# Patient Record
Sex: FEMALE | Race: WHITE | NOT HISPANIC OR LATINO | Employment: FULL TIME | URBAN - METROPOLITAN AREA
[De-identification: names, ages, dates, MRNs, and addresses within clinical notes are randomized per-mention and may not be internally consistent; named-entity substitution may affect disease eponyms.]

---

## 2017-01-13 ENCOUNTER — OFFICE VISIT - HEALTHEAST (OUTPATIENT)
Dept: MIDWIFE SERVICES | Facility: CLINIC | Age: 27
End: 2017-01-13

## 2017-01-13 DIAGNOSIS — Z30.432 ENCOUNTER FOR IUD REMOVAL: ICD-10-CM

## 2017-01-13 ASSESSMENT — MIFFLIN-ST. JEOR: SCORE: 1228.91

## 2017-03-29 ENCOUNTER — OFFICE VISIT - HEALTHEAST (OUTPATIENT)
Dept: MIDWIFE SERVICES | Facility: CLINIC | Age: 27
End: 2017-03-29

## 2017-03-29 DIAGNOSIS — Z00.00 HEALTH CARE MAINTENANCE: ICD-10-CM

## 2017-03-29 DIAGNOSIS — Z30.9 CONTRACEPTIVE MANAGEMENT: ICD-10-CM

## 2017-03-29 ASSESSMENT — MIFFLIN-ST. JEOR: SCORE: 1213.94

## 2017-09-17 ENCOUNTER — VIRTUAL VISIT (OUTPATIENT)
Dept: FAMILY MEDICINE | Facility: OTHER | Age: 27
End: 2017-09-17

## 2017-09-17 NOTE — PROGRESS NOTES
"Date:   Clinician: Christian Cristobal  Clinician NPI: [id number]  Patient: Tatum Cooper  Patient : 1990  Patient Address: [address] [place] [address]  Patient Phone: [phone]  Visit Protocol: GERD  Patient Summary:  Tatum is a 27 year old ( : 1990 ) female who initiated a Visit for evaluation of heartburn or reflux (GERD).  When asked the question \"Please sign me up to receive news, health information and promotions. \", Tatum responded \"No\".    Her symptoms have persisted for less than one month.  The patient currently feels her symptoms are a new sensation given the lack of previous GERD symptoms. And last less than one hour in duration.   Over the past 7 days, the patient had experienced:      Heartburn: never     Regurgitation: once     Nausea: for 4-7 days     Pain the center of the upper stomach: for 4-7 days     Difficulty getting a good night's sleep because of heartburn and/or regurgitation: once      Tatum has not taken medications to treat heartburn and/or regurgitation other than what was previously prescribed over the past 7 days.    Tatum claims:     Lying down does not aggravate symptoms     Bending over does not aggravate symptoms.      The patient denies:     Heartburn with unintentional weight loss.    Difficulty swallowing solid foods    Vomiting blood    Persistent vomiting.    Chest Pain    Wheezing associated with heartburn    Crohn's disease    Ulcerative colitis    Tumors in colon     A modified diet has NOT been effective.   The patient weighs approximately: 109 pounds and is 5 feet 4 inches tall.   PAST TREATMENTS:  None     She denies pregnancy and denies breastfeeding. She has menstruated in the past month.   Tatum does not smoke or use smokeless tobacco.   Additional information as reported by the patient (free text): I have been experiencing significant upper abdominal/stomach pain 30 minutes after eating for the past week. It " is somewhat alleviated by burping, but does not seem to matter what I eat.   MEDICATIONS:  None  , ALLERGIES:  NKDA    Clinician Response:  Dear Tatum,   I am sorry you are not feeling well. Your health is our priority. Based on the information provided, an in-person evaluation is required. Please be seen in a clinic or urgent care to receive the additional care you need.  You will not be charged for this Visit. Thank you for trusting us with your care.   Diagnosis: Refer for additional evaluation  Diagnosis ICD: R69  Diagnosis ICD: 462.0

## 2017-09-19 ENCOUNTER — OFFICE VISIT - HEALTHEAST (OUTPATIENT)
Dept: INTERNAL MEDICINE | Facility: CLINIC | Age: 27
End: 2017-09-19

## 2017-09-19 DIAGNOSIS — R10.13 EPIGASTRIC ABDOMINAL PAIN: ICD-10-CM

## 2017-09-19 ASSESSMENT — MIFFLIN-ST. JEOR: SCORE: 1207.14

## 2017-10-02 ENCOUNTER — AMBULATORY - HEALTHEAST (OUTPATIENT)
Dept: INTERNAL MEDICINE | Facility: CLINIC | Age: 27
End: 2017-10-02

## 2017-10-02 ENCOUNTER — COMMUNICATION - HEALTHEAST (OUTPATIENT)
Dept: INTERNAL MEDICINE | Facility: CLINIC | Age: 27
End: 2017-10-02

## 2017-10-02 DIAGNOSIS — R10.13 EPIGASTRIC PAIN: ICD-10-CM

## 2017-10-05 ENCOUNTER — COMMUNICATION - HEALTHEAST (OUTPATIENT)
Dept: INTERNAL MEDICINE | Facility: CLINIC | Age: 27
End: 2017-10-05

## 2017-10-05 ENCOUNTER — AMBULATORY - HEALTHEAST (OUTPATIENT)
Dept: LAB | Facility: CLINIC | Age: 27
End: 2017-10-05

## 2017-10-05 DIAGNOSIS — R10.13 EPIGASTRIC ABDOMINAL PAIN: ICD-10-CM

## 2017-10-06 ENCOUNTER — HOSPITAL ENCOUNTER (OUTPATIENT)
Dept: ULTRASOUND IMAGING | Facility: HOSPITAL | Age: 27
Discharge: HOME | End: 2017-10-06

## 2017-10-06 DIAGNOSIS — R10.13 EPIGASTRIC PAIN: ICD-10-CM

## 2017-12-10 ENCOUNTER — COMMUNICATION - HEALTHEAST (OUTPATIENT)
Dept: MIDWIFE SERVICES | Facility: CLINIC | Age: 27
End: 2017-12-10

## 2018-04-27 ENCOUNTER — OFFICE VISIT (OUTPATIENT)
Dept: FAMILY MEDICINE | Facility: CLINIC | Age: 28
End: 2018-04-27
Payer: COMMERCIAL

## 2018-04-27 VITALS
BODY MASS INDEX: 19.33 KG/M2 | HEIGHT: 64 IN | HEART RATE: 74 BPM | SYSTOLIC BLOOD PRESSURE: 107 MMHG | OXYGEN SATURATION: 97 % | TEMPERATURE: 97.3 F | WEIGHT: 113.25 LBS | DIASTOLIC BLOOD PRESSURE: 65 MMHG

## 2018-04-27 DIAGNOSIS — Z00.00 ANNUAL PHYSICAL EXAM: Primary | ICD-10-CM

## 2018-04-27 DIAGNOSIS — Z31.9 DESIRE FOR PREGNANCY: ICD-10-CM

## 2018-04-27 DIAGNOSIS — N92.6 IRREGULAR MENSTRUAL CYCLE: ICD-10-CM

## 2018-04-27 NOTE — MR AVS SNAPSHOT
After Visit Summary   4/27/2018    Tatum Cooper    MRN: [id number]           Patient Information     Date Of Birth          1990        Visit Information        Provider Department      4/27/2018 3:40 PM Jo Calloway MD [facility]        Care Instructions    [facility] [[facility]]: Tamara Malone or Sachinwashington Chowdhury [[place] or [place][]: [phone]    [facility] [[phone]]: [facility] in [place]:  Located in [place].  [facility]: [phone]  [Dolores Lozano B Campbell, MAX Brown]        Dr. Azul or Dr. Willis  At [facility] [phone]    [facility]             Follow-ups after your visit        Who to contact     Please call your clinic at [phone] to:    Ask questions about your health    Make or cancel appointments    Discuss your medicines    Learn about your test results    Speak to your doctor            Additional Information About Your Visit        [employer] Information     [employer] gives you secure access to your electronic health record. If you see a primary care provider, you can also send messages to your care team and make appointments. If you have questions, please call your primary care clinic.  If you do not have a primary care provider, please call [phone] and they will assist you.      [employer] is an electronic gateway that provides easy, online access to your medical records. With [employer], you can request a clinic appointment, read your test results, renew a prescription or communicate with your care team.     To access your existing account, please contact your [facility] or call [phone] for assistance.        Care EveryWhere ID     This is your Care EveryWhere ID. This could be used by other organizations to access your [facility] medical records  [id number]        Your Vitals Were      "Pulse Temperature Height Last Period Pulse Oximetry BMI (Body Mass Index)    74 97.3  F (36.3  C) (Temporal) 5' 4.25\" (163.2 cm) 03/30/2018 (Exact Date) 97% 19.29 kg/m2       Blood Pressure from Last 3 Encounters:   04/27/18 107/65   07/11/16 106/70   06/03/16 126/75    Weight from Last 3 Encounters:   04/27/18 113 lb 4 oz (51.4 kg)   07/11/16 113 lb 1.6 oz (51.3 kg)   06/03/16 114 lb 12.8 oz (52.1 kg)              Today, you had the following     No orders found for display         Today's Medication Changes          These changes are accurate as of 4/27/18  4:06 PM.  If you have any questions, ask your nurse or doctor.               Stop taking these medicines if you haven't already. Please contact your care team if you have questions.     levonorgestrel-ethinyl estradiol 0.1-20 MG-MCG per tablet   Commonly known as:  LUTERA   Stopped by:  Jo Calloway MD                    Primary Care Provider Office Phone # Fax #    Jo Calloway [phone]       [address] [place] [address]        Equal Access to Services     JUAN CARLOS COTTO AH: Pretty horneo Sobill, waaxda luqadaha, qaybta kaalmada adeegyada, shayy hardin. So [facility] [phone].    ATENCIÓN: Si habla español, tiene a forde disposición servicios gratuitos de asistencia lingüística. Llame al [phone].    We comply with applicable federal civil rights laws and [place] laws. We do not discriminate on the basis of race, color, national origin, age, disability, sex, sexual orientation, or gender identity.            Thank you!     Thank you for choosing [facility]  for your care. Our goal is always to provide you with excellent care. Hearing back from our patients is one way we can continue to improve our services. Please take a few minutes to complete the written survey that you may receive in the mail after your visit with us. Thank you!             Your Updated Medication List - " Protect others around you: Learn how to safely use, store and throw away your medicines at www.disposemymeds.org.          This list is accurate as of 4/27/18  4:06 PM.  Always use your most recent med list.                   Brand Name Dispense Instructions for use Diagnosis    OMEGA-3 FISH OIL PO       Travel advice encounter, Preventative health care       paragard intrauterine copper     1 each    Inserted on 6/3/16 Lot #[id number] exp 4/22    Encounter for IUD insertion       PRENATAL VITAMIN PO           PROBIOTIC PO           VITAMIN C PO      Take 500 mg by mouth daily

## 2018-04-27 NOTE — PROGRESS NOTES
"Tatum is a 28 year old female presents who presents to [facility] for annual exam:   HPI:  Healthy young woman who had her  IUD removed and has been attempting pregnancy off and on since August 2017.  Periods are irregular every 20-50 days. LMP March 30, 2018. Doing ovulation tests and +  Test with intercourse X three cycles without pregnancy. She is using an Maria Luisa called \"natural cycles.\"  Wondering about the use of progesterone during the last half of her cycle as her luteal phase is short.    - PAP 3/2017  -  No other concerns is considering another position but remains pleased with her current job.   ROS:  General: none  Head/Eyes: none  Ears/Nose/Throat: none  Cardiovascular: none  Respiratory: none  Gastrointestinal: none  Breast: none  Genitourinary: none  Sexual Function: none  Musculoskeletal: none  Skin: none  Neurological: none  Mental Health: none  Endocrine: none  PAST OB/GYN HISTORY  G0  Menarche:14  Cycles: every 20-50 days   Contraception: none    Immunization: [declined gardisel]   PAST MEDICAL HISTORY:  Past Medical History:   Diagnosis Date     Fracture of finger    Life Style Modifiers:   Tobacco:  reports that she has never smoked. She has never used smokeless tobacco.   Alcohol:  reports that she drinks alcohol.   Drug use:  reports that she does not use illicit drugs.  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   for three years.   MEDICATIONS:  Current Outpatient Prescriptions   Medication Sig Dispense Refill     Ascorbic Acid (VITAMIN C PO) Take 500 mg by mouth daily       Omega-3 Fatty Acids (OMEGA-3 FISH OIL " "PO)        Prenatal Vit-Fe Fumarate-FA (PRENATAL VITAMIN PO)        Probiotic Product (PROBIOTIC PO)        paragard intrauterine copper Inserted on 6/3/16 Lot #[id number] exp 4/22 (Patient not taking: Reported on 4/27/2018) 1 each 0   ALLERGIES:  Peggy-be [norethindrone (contraceptive)]  VITALS:  /65 (BP Location: Right arm, Patient Position: Chair, Cuff Size: Adult Regular)  Pulse 74  Temp 97.3  F (36.3  C) (Temporal)  Ht 5' 4.25\" (163.2 cm)  Wt 113 lb 4 oz (51.4 kg)  LMP 03/30/2018 (Exact Date)  SpO2 97%  BMI 19.29 kg/m2  PHYSICAL EXAM:  Constitutional: Well appearing woman in no acute distress.   Psychological: appropriate mood.  Eyes: anicteric, normal extra-ocular movements  Ears, Nose and Throat: tympanic membranes clear  Neck: No thyroidmegaly.   Cardiovascular: regular rate and rhythm, normal S1 and S2, no murmurs, rubs or gallops, peripheral pulses full and symmetric   Respiratory: clear to auscultation, no wheezes or crackles, normal breath sounds.  Breast: Symmetrical without visible distortion or swelling. No masses noted. No nipple inversion, no breast dimpling or puckering. Axillary area without masses or lympadenapathy.   Gastrointestinal: N/A   Musculoskeletal: full range of motion    Skin: no concerning lesions, no jaundice.  Neurological: normal gait, no tremor.   Diagnoses and associated orders for this visit:  Annual physical exam    - pap up to date and due 3/2019   -  Anticipating pregnancy since August 2017 with three confirmed + ovulations test/intercourse without pregnancy.   -  Irregular cycles of 20-50 days   -  Advised options for intervention: time, acupuncture, option to see GYN for evaluation and/or if no pregnancy in 6 months consider reproductive health with names given.   Other orders  -     Prenatal Vit-Fe Fumarate-FA (PRENATAL VITAMIN PO);     "

## 2018-04-27 NOTE — NURSING NOTE
"28 year old  Chief Complaint   Patient presents with     Physical     Annual Exam       Blood pressure 107/65, pulse 74, temperature 97.3  F (36.3  C), temperature source Temporal, height 5' 4.25\" (163.2 cm), weight 113 lb 4 oz (51.4 kg), last menstrual period 03/30/2018, SpO2 97 %. Body mass index is 19.29 kg/(m^2).  Patient Active Problem List   Diagnosis     Encounter for insertion of ParaGard IUD       Wt Readings from Last 2 Encounters:   04/27/18 113 lb 4 oz (51.4 kg)   07/11/16 113 lb 1.6 oz (51.3 kg)     BP Readings from Last 3 Encounters:   04/27/18 107/65   07/11/16 106/70   06/03/16 126/75         Current Outpatient Prescriptions   Medication     Ascorbic Acid (VITAMIN C PO)     levonorgestrel-ethinyl estradiol (LUTERA) 0.1-20 MG-MCG per tablet     Omega-3 Fatty Acids (OMEGA-3 FISH OIL PO)     paragard intrauterine copper     Prenatal Vit-Fe Fumarate-FA (PRENATAL VITAMIN PO)     Probiotic Product (PROBIOTIC PO)     No current facility-administered medications for this visit.        Social History   Substance Use Topics     Smoking status: Never Smoker     Smokeless tobacco: Never Used     Alcohol use Yes      Comment: Occasionally       Health Maintenance Due   Topic Date Due     HIV SCREEN (SYSTEM ASSIGNED)  03/01/2009     INFLUENZA VACCINE (1) 09/01/2017       Lab Results   Component Value Date    PAP NIL 03/22/2016       Bleinda Camp MA  April 27, 2018 3:35 PM    "

## 2018-04-27 NOTE — PATIENT INSTRUCTIONS
[facility] [[facility]]: Tamara Malone or Sachin Chowdhury [[place] or [place][]: [phone]    [facility] [[phone]]: [facility]ductive [facility] in [place]:  Located in [place].  [facility]: [phone]  [Dolores Lozano B Campbell, P [place]une[place]]        Dr. Azul or Dr. Willis  At [facility] [phone]    [facility]'s [facility]

## 2018-05-09 DIAGNOSIS — N92.6 IRREGULAR MENSES: Primary | ICD-10-CM

## 2018-05-10 DIAGNOSIS — N92.6 IRREGULAR MENSES: ICD-10-CM

## 2018-05-10 DIAGNOSIS — Z13.220 SCREENING FOR CHOLESTEROL LEVEL: ICD-10-CM

## 2018-05-10 DIAGNOSIS — Z13.21 ENCOUNTER FOR VITAMIN DEFICIENCY SCREENING: ICD-10-CM

## 2018-05-10 LAB
CHOLEST SERPL-MCNC: 202 MG/DL
HBA1C MFR BLD: 5 % (ref 0–5.6)
HDLC SERPL-MCNC: 102 MG/DL
LDLC SERPL CALC-MCNC: 91 MG/DL
NONHDLC SERPL-MCNC: 100 MG/DL
PROGEST SERPL-MCNC: 12.3 NG/ML
PROLACTIN SERPL-MCNC: 6 UG/L (ref 3–27)
TRIGL SERPL-MCNC: 46 MG/DL
TSH SERPL DL<=0.005 MIU/L-ACNC: 1.97 MU/L (ref 0.4–4)

## 2018-05-10 PROCEDURE — 80061 LIPID PANEL: CPT | Performed by: OBSTETRICS & GYNECOLOGY

## 2018-05-10 PROCEDURE — 84146 ASSAY OF PROLACTIN: CPT | Performed by: OBSTETRICS & GYNECOLOGY

## 2018-05-10 PROCEDURE — 83036 HEMOGLOBIN GLYCOSYLATED A1C: CPT | Performed by: OBSTETRICS & GYNECOLOGY

## 2018-05-10 PROCEDURE — 36415 COLL VENOUS BLD VENIPUNCTURE: CPT | Performed by: OBSTETRICS & GYNECOLOGY

## 2018-05-10 PROCEDURE — 84403 ASSAY OF TOTAL TESTOSTERONE: CPT | Performed by: OBSTETRICS & GYNECOLOGY

## 2018-05-10 PROCEDURE — 84443 ASSAY THYROID STIM HORMONE: CPT | Performed by: OBSTETRICS & GYNECOLOGY

## 2018-05-10 PROCEDURE — 84144 ASSAY OF PROGESTERONE: CPT | Performed by: OBSTETRICS & GYNECOLOGY

## 2018-05-10 PROCEDURE — 82306 VITAMIN D 25 HYDROXY: CPT | Performed by: OBSTETRICS & GYNECOLOGY

## 2018-05-10 PROCEDURE — 84270 ASSAY OF SEX HORMONE GLOBUL: CPT | Performed by: OBSTETRICS & GYNECOLOGY

## 2018-05-11 LAB — DEPRECATED CALCIDIOL+CALCIFEROL SERPL-MC: 69 UG/L (ref 20–75)

## 2018-05-12 LAB
SHBG SERPL-SCNC: 93 NMOL/L (ref 30–135)
TESTOST FREE SERPL-MCNC: 0.23 NG/DL (ref 0.08–0.74)
TESTOST SERPL-MCNC: 29 NG/DL (ref 8–60)

## 2018-05-31 ENCOUNTER — OFFICE VISIT (OUTPATIENT)
Dept: OBGYN | Facility: CLINIC | Age: 28
End: 2018-05-31
Attending: OBSTETRICS & GYNECOLOGY
Payer: COMMERCIAL

## 2018-05-31 VITALS
DIASTOLIC BLOOD PRESSURE: 70 MMHG | WEIGHT: 110 LBS | HEART RATE: 82 BPM | BODY MASS INDEX: 18.78 KG/M2 | HEIGHT: 64 IN | SYSTOLIC BLOOD PRESSURE: 111 MMHG

## 2018-05-31 DIAGNOSIS — N92.6 IRREGULAR MENSTRUAL CYCLE: Primary | ICD-10-CM

## 2018-05-31 PROCEDURE — 76830 TRANSVAGINAL US NON-OB: CPT | Mod: ZF

## 2018-05-31 PROCEDURE — G0463 HOSPITAL OUTPT CLINIC VISIT: HCPCS

## 2018-05-31 ASSESSMENT — PAIN SCALES - GENERAL: PAINLEVEL: NO PAIN (0)

## 2018-05-31 NOTE — LETTER
2018       RE: Tatum Cooper  [address] [address]     Dear Colleague,    Thank you for referring your patient, Tatum Cooper, to the [facility] at [facility]. Please see a copy of my visit note below.    Progress Note    SUBJECTIVE:  Tatum Cooper is an 28 year old, , here for referral from Dr. Calloway for possible PCOS. She is here to discuss her irregular menstrual cycle and how it may affect fertility. D/c long term use of OCP 2017, unprotected intercourse with her  since that time. Notes that cycle ranges from 22-60 days. Would like to conceive by the end of this summer. Uses a phone virgie and urine LH test to evaluate her cycle. Noticed shortened Luteal phase - + urine LH to time of menstruation 8d, has started getting accupuncture and last luteal phase to 11d.  Does have ovulatory symptoms with change in discharge. No history of painful menstruation or STIs. She is on prenatal vitamin, and takes no other regular medication and denies any known medical diseases.    Menstrual History:   Menstrual History 2018   LAST MENSTRUAL PERIOD 3/30/2018 2018 -   Menarche age - - 15   Period Cycle (Days) - - 52d +/- 23d (shortest 22d)   Period Duration (Days) - - 4   Method of Contraception - - -   Period Pattern - - Irregular   Menstrual Flow - - Moderate   Menstrual Control - - -   Dysmenorrhea - - None   PMS Symptoms - - -   Reviewed Today - - -   Comments - - On OCP from 18yo-23yo, and 25yo-25yo ; had regular withdrawal bleeding during this time     History of abnormal Pap smear:   NO - age 21-29 PAP every 3 years recommended  Last 3 Pap Results:   PAP (no units)   Date Value   2016 NIL       HISTORY:    Immunization History   Administered Date(s) Administered     DTAP (<7y) 1991, 1990, 1991, 10/02/1992, 1996     HEPA 2011, 2012     HepB  "02/24/2000, 04/24/2000, 09/14/2000     Hib (PRP-T) 1991, 1990, 1991, 06/09/1992     MMR 06/09/1992, 02/03/2001     Meningococcal (Menomune ) 04/22/2009     Poliovirus, inactivated (IPV) 1991, 1991, 06/02/1992, 06/27/1996     TD (ADULT, 7+) 08/19/2003     Tdap (Adacel,Boostrix) 04/22/2009     Varicella 11/07/1995       Obstetric History     No data available     Past Medical History:   Diagnosis Date     Fracture of finger      Past Surgical History:   Procedure Laterality Date     CLOSED REDUCTION, PERCUTANEOUS PINNING  HAND, COMBINED  4/25/2014    Procedure: COMBINED CLOSED REDUCTION, PERCUTANEOUS PINNING HAND;  Surgeon: Ayala Rabago MD;  Location:  OR      TOOTH EXTRACTION W/FORCEP       Family History   Problem Relation Age of Onset     Breast Cancer Maternal Grandmother      DIABETES Maternal Grandmother      type II, lifestyle related     Social History     Social History     Marital status: Single     Spouse name: N/A     Number of children: N/A     Years of education: N/A     Social History Main Topics     Smoking status: Never Smoker     Smokeless tobacco: Never Used     Alcohol use Yes      Comment: Occasionally     Drug use: No     Sexual activity: Yes     Partners: Male     Birth control/ protection: Pill     Other Topics Concern     Not on file     Social History Narrative     ROS      Objective:  Blood pressure 111/70, pulse 82, height 1.632 m (5' 4.25\"), weight 49.9 kg (110 lb), last menstrual period 05/14/2018, not currently breastfeeding. Body mass index is 18.73 kg/(m^2).  General - pleasant female in no acute distress.    TVUS 5/31/18: R ovary 1 follicle, L ovary 0 follicles, endometrium 7mm    Labs 5/10/18:  Progesterone 12.3 (post-ovulatory)  Polactin  6  SHBG  93  Testosterone 29  Free Testosterone 0.23      ASSESSMENT/PLAN:  #Irregular cycle with shortened luteal phase - Discussed with pt that the TVUS and post-ovulatory progesterone demonstrates " ovulation- though recent US did not correlate with urine LH testing. As it is under 1 yr, pt agreed to less invasive methods of intervention and evaluation will paln to continue accupuncture at this time.  -Discussed option of progesterone supplementation for luteal phase, although there is no data to support efficacy.   -Discussed OI if cycles remain irregular. Would recommend HSG and semen analysis prior to proceeding.   -advised that small amount of weight gain (~5lbs) at a BMI around 18 may also help with conception.    Additional teaching done at this visit regarding preconception.    Will return if no pregnancy within 1 year or as needed.     I, Ciera Renner, am serving as a scribe; to document services personally performed by  Dr. Willis based on data collection and the provider's statements to me.     Ciera Renner    I, Dr. Willis, personally performed the services described in this documentation, as scribed by Ciera Osborn in my presence, and it is both accurate and complete.   Geri Willis MD

## 2018-05-31 NOTE — NURSING NOTE
Chief Complaint   Patient presents with     RECHECK     Follow-up pelvic ULS   HX PCOS   Carolyne Sandoval LPN

## 2018-05-31 NOTE — PROGRESS NOTES
Progress Note    SUBJECTIVE:  Tatum Cooper is an 28 year old, , here for referral from Dr. Calloway for possible PCOS. She is here to discuss her irregular menstrual cycle and how it may affect fertility. D/c long term use of OCP 2017, unprotected intercourse with her  since that time. Notes that cycle ranges from 22-60 days. Would like to conceive by the end of this summer. Uses a phone virgie and urine LH test to evaluate her cycle. Noticed shortened Luteal phase - + urine LH to time of menstruation 8d, has started getting accupuncture and last luteal phase to 11d.  Does have ovulatory symptoms with change in discharge. No history of painful menstruation or STIs. She is on prenatal vitamin, and takes no other regular medication and denies any known medical diseases.    Menstrual History:   Menstrual History 2018   LAST MENSTRUAL PERIOD 3/30/2018 2018 -   Menarche age - - 15   Period Cycle (Days) - - 52d +/- 23d (shortest 22d)   Period Duration (Days) - - 4   Method of Contraception - - -   Period Pattern - - Irregular   Menstrual Flow - - Moderate   Menstrual Control - - -   Dysmenorrhea - - None   PMS Symptoms - - -   Reviewed Today - - -   Comments - - On OCP from 18yo-23yo, and 25yo-25yo ; had regular withdrawal bleeding during this time     History of abnormal Pap smear:   NO - age 21-29 PAP every 3 years recommended  Last 3 Pap Results:   PAP (no units)   Date Value   2016 NIL       HISTORY:    Immunization History   Administered Date(s) Administered     DTAP (<7y) 1991, 1991, 1991, 10/02/1992, 1996     HEPA 2011, 2012     HepB 2000, 2000, 2000     Hib (PRP-T) 1991, 1991, 1991, 1992     MMR 1992, 2001     Meningococcal (Menomune ) 2009     Poliovirus, inactivated (IPV) 1991, 1991, 1992, 1996     TD (ADULT, 7+) 2003     Tdap  "(Adacel,Boostrix) 04/22/2009     Varicella 11/07/1995       Obstetric History     No data available     Past Medical History:   Diagnosis Date     Fracture of finger      Past Surgical History:   Procedure Laterality Date     CLOSED REDUCTION, PERCUTANEOUS PINNING  HAND, COMBINED  4/25/2014    Procedure: COMBINED CLOSED REDUCTION, PERCUTANEOUS PINNING HAND;  Surgeon: Ayala Rabago MD;  Location: [facility] OR      TOOTH EXTRACTION W/FORCEP       Family History   Problem Relation Age of Onset     Breast Cancer Maternal Grandmother      DIABETES Maternal Grandmother      type II, lifestyle related     Social History     Social History     Marital status: Single     Spouse name: N/A     Number of children: N/A     Years of education: N/A     Social History Main Topics     Smoking status: Never Smoker     Smokeless tobacco: Never Used     Alcohol use Yes      Comment: Occasionally     Drug use: No     Sexual activity: Yes     Partners: Male     Birth control/ protection: Pill     Other Topics Concern     Not on file     Social History Narrative     ROS      Objective:  Blood pressure 111/70, pulse 82, height 1.632 m (5' 4.25\"), weight 49.9 kg (110 lb), last menstrual period 05/14/2018, not currently breastfeeding. Body mass index is 18.73 kg/(m^2).  General - pleasant female in no acute distress.    TVUS 5/31/18: R ovary 1 follicle, L ovary 0 follicles, endometrium 7mm    Labs 5/10/18:  Progesterone 12.3 (post-ovulatory)  Polactin  6  SHBG  93  Testosterone 29  Free Testosterone 0.23      ASSESSMENT/PLAN:  #Irregular cycle with shortened luteal phase - Discussed with pt that the TVUS and post-ovulatory progesterone demonstrates ovulation- though recent US did not correlate with urine LH testing. As it is under 1 yr, pt agreed to less invasive methods of intervention and evaluation will paln to continue accupuncture at this time.  -Discussed option of progesterone supplementation for luteal phase, although there is " no data to support efficacy.   -Discussed OI if cycles remain irregular. Would recommend HSG and semen analysis prior to proceeding.   -advised that small amount of weight gain (~5lbs) at a BMI around 18 may also help with conception.    Additional teaching done at this visit regarding preconception.    Will return if no pregnancy within 1 year or as needed.     I, Ciera Renner, am serving as a scribe; to document services personally performed by  Dr. Willis based on data collection and the provider's statements to me.     Ciera Renner    I, Dr. Willis, personally performed the services described in this documentation, as scribed by Ciera Osborn in my presence, and it is both accurate and complete.   Geri Willis MD

## 2018-05-31 NOTE — MR AVS SNAPSHOT
"              After Visit Summary   5/31/2018    Tatum Cooper    MRN: [id number]           Patient Information     Date Of Birth          1990        Visit Information        Provider Department      5/31/2018 2:45 PM Geri Willis MD [facility]        Today's Diagnoses     Irregular menstrual cycle    -  1       Follow-ups after your visit        Who to contact     Please call your clinic at [phone] to:    Ask questions about your health    Make or cancel appointments    Discuss your medicines    Learn about your test results    Speak to your doctor            Additional Information About Your Visit        MyChart Information     [email or web address] gives you secure access to your electronic health record. If you see a primary care provider, you can also send messages to your care team and make appointments. If you have questions, please call your primary care clinic.  If you do not have a primary care provider, please call [phone] and they will assist you.      [email or web address] is an electronic gateway that provides easy, online access to your medical records. With [email or web address], you can request a clinic appointment, read your test results, renew a prescription or communicate with your care team.     To access your existing account, please contact your [facility] or call [phone] for assistance.        Care EveryWhere ID     This is your Care EveryWhere ID. This could be used by other organizations to access your [place] medical records  [id number]        Your Vitals Were     Pulse Height Last Period Breastfeeding? BMI (Body Mass Index)       82 1.632 m (5' 4.25\") 05/14/2018 No 18.73 kg/m2        Blood Pressure from Last 3 Encounters:   05/31/18 111/70   04/27/18 107/65   07/11/16 106/70    Weight from Last 3 Encounters:   05/31/18 49.9 kg (110 lb)   04/27/18 51.4 kg (113 lb 4 oz)   07/11/16 51.3 kg (113 lb 1.6 oz)              Today, you had the " following     No orders found for display         Today's Medication Changes          These changes are accurate as of 5/31/18 11:59 PM.  If you have any questions, ask your nurse or doctor.               Stop taking these medicines if you haven't already. Please contact your care team if you have questions.     paragard intrauterine copper   Stopped by:  Geri Willis MD                    Primary Care Provider Office Phone # Fax #    Jo Calloway [phone]       [address] [address]        Equal Access to Services     [facility]: Hadii aad ku hadasho Soomaali, waaxda luqadaha, qaybta kaalmada adeegyada, waxyonny romo . So [facility] [phone].    ATENCIÓN: Si habla español, tiene a forde disposición servicios gratuitos de asistencia lingüística. [facility] [phone].    We comply with applicable federal civil rights laws and [place] laws. We do not discriminate on the basis of race, color, national origin, age, disability, sex, sexual orientation, or gender identity.            Thank you!     Thank you for choosing [facility]  for your care. Our goal is always to provide you with excellent care. Hearing back from our patients is one way we can continue to improve our services. Please take a few minutes to complete the written survey that you may receive in the mail after your visit with us. Thank you!             Your Updated Medication List - Protect others around you: Learn how to safely use, store and throw away your medicines at www.disposemymeds.org.          This list is accurate as of 5/31/18 11:59 PM.  Always use your most recent med list.                   Brand Name Dispense Instructions for use Diagnosis    OMEGA-3 FISH OIL PO       Travel advice encounter, Preventative health care       PRENATAL VITAMIN PO           PROBIOTIC PO           VITAMIN C PO      Take 500 mg by mouth daily

## 2018-05-31 NOTE — MR AVS SNAPSHOT
After Visit Summary   5/31/2018    Tatum Cooper    MRN: [id number]           Patient Information     Date Of Birth          1990        Visit Information        Provider Department      5/31/2018 3:00 PM [facility] ULTRASOUND [facility]        Today's Diagnoses     Irregular menstrual cycle    -  1       Follow-ups after your visit        Who to contact     Please call your clinic at [phone] to:    Ask questions about your health    Make or cancel appointments    Discuss your medicines    Learn about your test results    Speak to your doctor            Additional Information About Your Visit        MyChart Information     [employer] gives you secure access to your electronic health record. If you see a primary care provider, you can also send messages to your care team and make appointments. If you have questions, please call your primary care clinic.  If you do not have a primary care provider, please call [phone] and they will assist you.      [employer] is an electronic gateway that provides easy, online access to your medical records. With [employer], you can request a clinic appointment, read your test results, renew a prescription or communicate with your care team.     To access your existing account, please contact your [facility] or call [phone] for assistance.        Care EveryWhere ID     This is your Care EveryWhere ID. This could be used by other organizations to access your [place] medical records  [id number]        Your Vitals Were     Last Period                   05/14/2018            Blood Pressure from Last 3 Encounters:   05/31/18 111/70   04/27/18 107/65   07/11/16 106/70    Weight from Last 3 Encounters:   05/31/18 49.9 kg (110 lb)   04/27/18 51.4 kg (113 lb 4 oz)   07/11/16 51.3 kg (113 lb 1.6 oz)                 Today's Medication Changes          These changes are accurate as of 5/31/18  5:31 PM.  If you have any  questions, ask your nurse or doctor.               Stop taking these medicines if you haven't already. Please contact your care team if you have questions.     paragard intrauterine copper   Stopped by:  Geri Willis MD                    Primary Care Provider Office Phone # Fax #    Jo Calloway [phone]       [address]  [facility] [address]        Equal Access to Services     [facility]: Hadii aad ku hadasho Soomaali, waaxda luqadaha, qaybta kaalmada adeegyada, waxay idiin hayaan adeeg kharash la'aan . So [facility] [phone].    ATENCIÓN: Si habla español, tiene a forde disposición servicios gratuitos de asistencia lingüística. Rosalinaame al [phone].    We comply with applicable federal civil rights laws and [place] laws. We do not discriminate on the basis of race, color, national origin, age, disability, sex, sexual orientation, or gender identity.            Thank you!     Thank you for choosing [facility]  for your care. Our goal is always to provide you with excellent care. Hearing back from our patients is one way we can continue to improve our services. Please take a few minutes to complete the written survey that you may receive in the mail after your visit with us. Thank you!             Your Updated Medication List - Protect others around you: Learn how to safely use, store and throw away your medicines at www.disposemymeds.org.          This list is accurate as of 5/31/18  5:31 PM.  Always use your most recent med list.                   Brand Name Dispense Instructions for use Diagnosis    OMEGA-3 FISH OIL PO       Travel advice encounter, Preventative health care       PRENATAL VITAMIN PO           PROBIOTIC PO           VITAMIN C PO      Take 500 mg by mouth daily

## 2018-07-30 ENCOUNTER — MYC MEDICAL ADVICE (OUTPATIENT)
Dept: OBGYN | Facility: CLINIC | Age: 28
End: 2018-07-30

## 2018-10-11 ENCOUNTER — OFFICE VISIT (OUTPATIENT)
Dept: OBGYN | Facility: CLINIC | Age: 28
End: 2018-10-11
Attending: ADVANCED PRACTICE MIDWIFE
Payer: COMMERCIAL

## 2018-10-11 ENCOUNTER — RADIANT APPOINTMENT (OUTPATIENT)
Dept: ULTRASOUND IMAGING | Facility: CLINIC | Age: 28
End: 2018-10-11
Attending: ADVANCED PRACTICE MIDWIFE
Payer: COMMERCIAL

## 2018-10-11 VITALS
BODY MASS INDEX: 21.25 KG/M2 | SYSTOLIC BLOOD PRESSURE: 106 MMHG | HEIGHT: 64 IN | DIASTOLIC BLOOD PRESSURE: 69 MMHG | HEART RATE: 70 BPM | WEIGHT: 124.5 LBS

## 2018-10-11 DIAGNOSIS — Z34.91 ENCOUNTER FOR SUPERVISION OF NORMAL PREGNANCY IN FIRST TRIMESTER, UNSPECIFIED GRAVIDITY: ICD-10-CM

## 2018-10-11 DIAGNOSIS — Z36.82 ENCOUNTER FOR NUCHAL TRANSLUCENCY TESTING: ICD-10-CM

## 2018-10-11 DIAGNOSIS — Z36.89 ENCOUNTER FOR FETAL ANATOMIC SURVEY: ICD-10-CM

## 2018-10-11 DIAGNOSIS — Z34.01 ENCOUNTER FOR SUPERVISION OF NORMAL FIRST PREGNANCY IN FIRST TRIMESTER: Primary | ICD-10-CM

## 2018-10-11 PROBLEM — D48.5 NEOPLASM OF UNCERTAIN BEHAVIOR OF SKIN: Status: ACTIVE | Noted: 2018-10-11

## 2018-10-11 PROCEDURE — G0463 HOSPITAL OUTPT CLINIC VISIT: HCPCS | Mod: 25,ZF

## 2018-10-11 PROCEDURE — 76817 TRANSVAGINAL US OBSTETRIC: CPT

## 2018-10-11 RX ORDER — PYRIDOXINE HCL (VITAMIN B6) 25 MG
TABLET ORAL
COMMUNITY
Start: 2018-09-26 | End: 2019-01-21

## 2018-10-11 ASSESSMENT — PAIN SCALES - GENERAL: PAINLEVEL: NO PAIN (0)

## 2018-10-11 NOTE — LETTER
10/11/2018       RE: Tatum Cooper  [address] [facility] [address]     Dear Colleague,    Thank you for referring your patient, Tatum Cooper, to the [facility] at [facility]. Please see a copy of my visit note below.    [facility] OB Intake note  Subjective   28 year old woman presents to clinic for initiation of OB care.  Patient's last menstrual period was 2018.    at 7w5d by Estimated Date of Delivery: May 25, 2019 based on dating ultrasound, not c/w LMP.  Reviewed dating ultrasound. Right ovarian cyst noted- no follow up. Pregnancy is planned.      Symptoms since LMP include nausea.  Patient has tried these relief measures: diet modification, vitamin B-6, Unisom, small frequent meals, increased rest and increased fluids.    - Genetic/Infection questionnaire completed, risks include: had varicella vaccine  Have you traveled during the pregnancy?No  Have your sexual partner(s) travelled during the pregnancy?No    - Current Medications    Current Outpatient Prescriptions   Medication Sig Dispense Refill     doxylamine (UNISOM) 12.5 mg TABS half-tab        Prenatal Vit-Fe Fumarate-FA (PRENATAL VITAMIN PO)        pyridOXINE (VITAMIN  B-6) 25 MG tablet        Omega-3 Fatty Acids (OMEGA-3 FISH OIL PO)        Probiotic Product (PROBIOTIC PO)            - Co-morbids    Past Medical History:   Diagnosis Date     Fracture of finger      - Risk for GDM -  NONE    - The patient does not h/o Pre Eclampsia, Current multi fetal gestation, Pre Gestational Diabetes (Type 1 or Type 2), chronic hypertension, renal disease, Autoimmune disease (systematic lupus erythematosus, antiphospholipid syndrome) so WILL NOT start low dose aspirin (81mg) starting between 12 and 28 weeks to prevent preeclampsia.    - The patient  does not have a history of spontaneous  birth so  WILL NOT consider progesterone starting at 16-20 weeks and/or serial transvaginal  "cervical length ultrasounds from 16-24 weeks.         PERSONAL/SOCIAL HISTORY  Lives with  \"Aaron.\"   Met almost 7 years ago when they did study abroad together in [place]. Went to [place].   Have been  3 years.   Employment: Works as a  for [employer]. Working in [place].   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   Additional items: Denies past or present domestic violence, sexual and psychological abuse.    Objective  -VS: reviewed and within normal limits   -General appearance: no acute distress, patient is comfortable   NEUROLOGICAL/PSYCHIATRIC   - Oriented x3,   -Mood and affect: : normal     Pap 3/22/2016 NIL  No history of abnormal pap    Assessment/Plan  Tatum was seen today for prenatal care.    Diagnoses and all orders for this visit:    Encounter for supervision of normal first pregnancy in first trimester  -     25- OH-Vitamin D; Future  -     Urine Culture Aerobic Bacterial [QDV034]; Future  -     ABO/Rh Type and Screen [[id number]]; Future  -     CBC with Platelets Differential [QEV445]; Future  -     Rubella Antibody IgG Quantitative [[id number]]; Future  -     Hepatitis B Surface Antigen [XIJ867]; Future  -     Hepatitis B Surface Antibody; Future  -     Treponema Abs w Reflex to RPR and Titer; Future  -     Varicella Zoster Virus Antibody IgG; Future  -     HIV Antigen Antibody Combo [[id number]]; Future  -     [facility] Referral [9046.022]    Encounter for fetal anatomic survey  -     [facility] Referral [9046.022]    Encounter for nuchal translucency testing  -     [facility] Referral [9046.022]      28 year old  7w5d weeks of pregnancy with ALLEGRA of May 25, 2019 by dating ultrasound.    Outpatient Encounter Prescriptions as of 10/11/2018   Medication Sig Dispense Refill     doxylamine (UNISOM) 12.5 mg TABS half-tab        Prenatal Vit-Fe Fumarate-FA (PRENATAL VITAMIN PO)        " pyridOXINE (VITAMIN  B-6) 25 MG tablet        Omega-3 Fatty Acids (OMEGA-3 FISH OIL PO)        Probiotic Product (PROBIOTIC PO)        [DISCONTINUED] Ascorbic Acid (VITAMIN C PO) Take 500 mg by mouth daily       No facility-administered encounter medications on file as of 10/11/2018.     No orders of the defined types were placed in this encounter.                  Orders Placed This Encounter   Procedures     25- OH-Vitamin D     CBC with Platelets Differential [SZH160]     Rubella Antibody IgG Quantitative [[id number]]     Hepatitis B Surface Antigen [WWB742]     Hepatitis B Surface Antibody     Treponema Abs w Reflex to RPR and Titer     Varicella Zoster Virus Antibody IgG     HIV Antigen Antibody Combo [[id number]]     [facility] Referral [9046.022]     ABO/Rh Type and Screen [EWE848]     - Oriented to Practice, types of care, and how to reach a provider.  Pt prefers CNM care.  - Patient received 1st trimester new OB education packet complete with aide of The Expectant Family booklet including information on genetic screening test options.  - Patient desires NIPT testing and level II ultrasound which were ordered.  - Patient was encouraged to start prenatal vitamins as tolerated.      - Pregnancy concerns to be addressed by provider at new OB exam include: OBI labs ordered as future; Needs GC/CT.    - OBI education reviewed.  - OBI labs ordered as future per patient preference.  - Varicella added to labs d/t no history of chicken pox (had vaccine).  - Desires to defer OBI labs until day of NIPT testing around 10 weeks  - Needs GC/CT at NOB.  - Pap due postpartum (3/22/2019).  - Desires NIPT testing only and level 2 ultrasound. Hoping for NIPT testing as close to 10 weeks as possible. MFM referral placed.  - Will need GC/CT.  - Desires CNM care.     Pt to RTO for NOB visit in 2-3 weeks and prn if questions or concerns    TIGRE Luo, ADEEL

## 2018-10-11 NOTE — PATIENT INSTRUCTIONS
"  Thank you for choosing [facility] for your [facility]etrical care.  We are an integrated health clinic with obstetricians, midwives, a psychologist, an acupuncturist, a nutritionist, a pharmacist, internal medicine and family practice all in one.  If you have questions about services offered please ask.      o Please keep all appointments with your provider.  You will help catch, prevent and treat problems early.  o Review your Expectant Family booklet and folder given to you at this intake visit.  It can answer many basic questions including:    Treatment for nausea and vomiting    Medications that are safe in pregnancy    Healthy diet and weight gain    Exercise and activity  o ASK questions!!  Please use \"Questions for my New OB visit\" form to write down any questions or concerns for your next visit.  o Eat a healthy diet.  Visit www.choosemyplate.gov and click on  Pregnancy and Breastfeeding  for information and tips  o Do not smoke.  Avoid other people's smoke, too.  We are happy to help with referrals to stop smoking programs.  o Do not drink alcohol.  o Try to avoid people who have colds or other infections.  Practice good hand washing.  o Consider registering for our Healthy Pregnancy Class here at [facility].  This class is offered every 3rd Wednesday from 2:30-4:30 p.m.  [place] at [phone] or online at [email or web address] or [email or web address]  o Consider registering for prenatal education classes through [facility].  You can view class schedules and register online at [email or web address] or call [phone] for questions     For urgent concerns, call [facility] at [phone] to speak with a triage nurse during regular clinic hours (8:00 am - 4:30 pm).  This line is answered by our service 24 hours a day, after hours a provider will return your call.  "

## 2018-10-11 NOTE — PROGRESS NOTES
"[facility] OB Intake note  Subjective   28 year old woman presents to clinic for initiation of OB care.  Patient's last menstrual period was 2018.    at 7w5d by Estimated Date of Delivery: May 25, 2019 based on dating ultrasound, not c/w LMP.  Reviewed dating ultrasound. Right ovarian cyst noted- no follow up. Pregnancy is planned.      Symptoms since LMP include nausea.  Patient has tried these relief measures: diet modification, vitamin B-6, Unisom, small frequent meals, increased rest and increased fluids.    - Genetic/Infection questionnaire completed, risks include: had varicella vaccine  Have you traveled during the pregnancy?No  Have your sexual partner(s) travelled during the pregnancy?No    - Current Medications    Current Outpatient Prescriptions   Medication Sig Dispense Refill     doxylamine (UNISOM) 12.5 mg TABS half-tab        Prenatal Vit-Fe Fumarate-FA (PRENATAL VITAMIN PO)        pyridOXINE (VITAMIN  B-6) 25 MG tablet        Omega-3 Fatty Acids (OMEGA-3 FISH OIL PO)        Probiotic Product (PROBIOTIC PO)            - Co-morbids    Past Medical History:   Diagnosis Date     Fracture of finger      - Risk for GDM -  NONE    - The patient does not h/o Pre Eclampsia, Current multi fetal gestation, Pre Gestational Diabetes (Type 1 or Type 2), chronic hypertension, renal disease, Autoimmune disease (systematic lupus erythematosus, antiphospholipid syndrome) so WILL NOT start low dose aspirin (81mg) starting between 12 and 28 weeks to prevent preeclampsia.    - The patient  does not have a history of spontaneous  birth so  WILL NOT consider progesterone starting at 16-20 weeks and/or serial transvaginal cervical length ultrasounds from 16-24 weeks.         PERSONAL/SOCIAL HISTORY  Lives with  \"Aaron.\"   Met almost 7 years ago when they did study abroad together in [place]. Went to [place].   Have been  3 years.   Employment: Works as a  for [employer]. Working in [place].   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   Additional items: Denies past or present domestic violence, sexual and psychological abuse.    Objective  -VS: reviewed and within normal limits   -General appearance: no acute distress, patient is comfortable   NEUROLOGICAL/PSYCHIATRIC   - Oriented x3,   -Mood and affect: : normal     Pap 3/22/2016 NIL  No history of abnormal pap    Assessment/Plan  Tatum was seen today for prenatal care.    Diagnoses and all orders for this visit:    Encounter for supervision of normal first pregnancy in first trimester  -     25- OH-Vitamin D; Future  -     Urine Culture Aerobic Bacterial [GXJ209]; Future  -     ABO/Rh Type and Screen [ZVK977]; Future  -     CBC with Platelets Differential [HAP845]; Future  -     Rubella Antibody IgG Quantitative [[id number]]; Future  -     Hepatitis B Surface Antigen [SBI789]; Future  -     Hepatitis B Surface Antibody; Future  -     Treponema Abs w Reflex to RPR and Titer; Future  -     Varicella Zoster Virus Antibody IgG; Future  -     HIV Antigen Antibody Combo [[id number]]; Future  -     [facility] Referral [9046.022]    Encounter for fetal anatomic survey  -     [facility] Referral [9046.022]    Encounter for nuchal translucency testing  -     [facility] Referral [9046.022]      28 year old  7w5d weeks of pregnancy with ALLEGRA of May 25, 2019 by dating ultrasound.    Outpatient Encounter Prescriptions as of 10/11/2018   Medication Sig Dispense Refill     doxylamine (UNISOM) 12.5 mg TABS half-tab        Prenatal Vit-Fe Fumarate-FA (PRENATAL VITAMIN PO)        pyridOXINE (VITAMIN  B-6) 25 MG tablet        Omega-3 Fatty Acids (OMEGA-3 FISH OIL PO)        Probiotic Product (PROBIOTIC PO)        [DISCONTINUED] Ascorbic Acid (VITAMIN C PO) Take 500 mg by mouth daily       No facility-administered encounter medications on file as of 10/11/2018.     No  orders of the defined types were placed in this encounter.                  Orders Placed This Encounter   Procedures     25- OH-Vitamin D     CBC with Platelets Differential [JUX239]     Rubella Antibody IgG Quantitative [EMD8167]     Hepatitis B Surface Antigen [QPW427]     Hepatitis B Surface Antibody     Treponema Abs w Reflex to RPR and Titer     Varicella Zoster Virus Antibody IgG     HIV Antigen Antibody Combo [[id number]]     [facility] Referral [9046.022]     ABO/Rh Type and Screen [LBC946]     - Oriented to Practice, types of care, and how to reach a provider.  Pt prefers CNM care.  - Patient received 1st trimester new OB education packet complete with aide of The Expectant Family booklet including information on genetic screening test options.  - Patient desires NIPT testing and level II ultrasound which were ordered.  - Patient was encouraged to start prenatal vitamins as tolerated.      - Pregnancy concerns to be addressed by provider at new OB exam include: OBI labs ordered as future; Needs GC/CT.    - OBI education reviewed.  - OBI labs ordered as future per patient preference.  - Varicella added to labs d/t no history of chicken pox (had vaccine).  - Desires to defer OBI labs until day of NIPT testing around 10 weeks  - Needs GC/CT at NOB.  - Pap due postpartum (3/22/2019).  - Desires NIPT testing only and level 2 ultrasound. Hoping for NIPT testing as close to 10 weeks as possible. MFM referral placed.  - Will need GC/CT.  - Desires CNM care.     Pt to RTO for NOB visit in 2-3 weeks and prn if questions or concerns    TIGRE Luo, ADEEL

## 2018-10-11 NOTE — MR AVS SNAPSHOT
"              After Visit Summary   10/11/2018    Tatum Cooper    MRN: [id number]           Patient Information     Date Of Birth          1990        Visit Information        Provider Department      10/11/2018 8:30 AM Jarod Matthew CNM [facility]        Today's Diagnoses     Encounter for supervision of normal first pregnancy in first trimester    -  1    Encounter for fetal anatomic survey        Encounter for nuchal translucency testing          Care Instructions      Thank you for choosing [facility] for your obstetrical care.  We are an integrated health clinic with obstetricians, midwives, a psychologist, an acupuncturist, a nutritionist, a pharmacist, internal medicine and family practice all in one.  If you have questions about services offered please ask.      o Please keep all appointments with your provider.  You will help catch, prevent and treat problems early.  o Review your Expectant Family booklet and folder given to you at this intake visit.  It can answer many basic questions including:    Treatment for nausea and vomiting    Medications that are safe in pregnancy    Healthy diet and weight gain    Exercise and activity  o ASK questions!!  Please use \"Questions for my New OB visit\" form to write down any questions or concerns for your next visit.  o Eat a healthy diet.  Visit www.choosemyplate.gov and click on  Pregnancy and Breastfeeding  for information and tips  o Do not smoke.  Avoid other people's smoke, too.  We are happy to help with referrals to stop smoking programs.  o Do not drink alcohol.  o Try to avoid people who have colds or other infections.  Practice good hand washing.  o Consider registering for our Healthy Pregnancy Class here at [facility].  This class is offered every 3rd Wednesday from 2:30-4:30 p.m.  [place] at [phone] or online at [email or web address] or " [email or web address]  o Consider registering for prenatal education classes through [facility] at [facility].  You can view class schedules and register online at [email or web address] or call [phone] for questions     For urgent concerns, call [facility] at [phone] to speak with a triage nurse during regular clinic hours (8:00 am - 4:30 pm).  This line is answered by our service 24 hours a day, after hours a provider will return your call.          Follow-ups after your visit        Additional Services     Maternal Fetal Medicine Center Referral [9046.022]       Body mass index is 21.2 kg/(m^2).    >> Patient may proceed with recommendations for further testing as directed by the Maternal Fetal Medicine Specialist >>    >> If requesting Fetal Echo: MFM will determine appropriate location for exam due to indication.    >> If requesting Lung Maturity Amnio:  If results indicate fetal lung maturity, induction or C/S is recommended within 36 hours.  Please schedule accordingly.     Dear Patient:   Please be aware that coverage of these services is subject to the terms and limitations of your health insurance plan.  Call member services at your health plan with any benefit or coverage questions.      Please bring the following to your appointment:    >>  Any x-rays, CTs or MRIs which have been performed.  Contact the facility where they were done to arrange for  prior to your scheduled appointment.  Any new CT, MRI or other procedures ordered by your specialist must be performed at a [facility] facility or coordinated by your clinic's referral office.  >>  List of current medications   >>  This referral request   >>  Any documents/labs given to you for this referral                  Follow-up notes from your care team     Return in about 3 weeks (around 11/1/2018) for ALLISON- CNM.      Future tests that were ordered for you today     Open Future Orders        Priority  Expected Expires Ordered    25- OH-Vitamin D Routine  10/11/2019 10/11/2018    Urine Culture Aerobic Bacterial [OKF292] Routine  10/11/2019 10/11/2018    ABO/Rh Type and Screen [SRI630] Routine  10/11/2019 10/11/2018    CBC with Platelets Differential [LSG130] Routine  10/11/2019 10/11/2018    Rubella Antibody IgG Quantitative [VWH8546] Routine  10/11/2019 10/11/2018    Hepatitis B Surface Antigen [TRO987] Routine  10/11/2019 10/11/2018    Hepatitis B Surface Antibody Routine  10/11/2019 10/11/2018    Treponema Abs w Reflex to RPR and Titer Routine  10/11/2019 10/11/2018    Varicella Zoster Virus Antibody IgG Routine  10/11/2019 10/11/2018    HIV Antigen Antibody Combo [PEC7277] Routine  10/11/2019 10/11/2018            Who to contact     Please call your clinic at [phone] to:    Ask questions about your health    Make or cancel appointments    Discuss your medicines    Learn about your test results    Speak to your doctor            Additional Information About Your Visit        [employer] Information     [employer] gives you secure access to your electronic health record. If you see a primary care provider, you can also send messages to your care team and make appointments. If you have questions, please call your primary care clinic.  If you do not have a primary care provider, please call [phone] and they will assist you.      [employer] is an electronic gateway that provides easy, online access to your medical records. With [employer], you can request a clinic appointment, read your test results, renew a prescription or communicate with your care team.     To access your existing account, please contact your [facility] or call [phone] for assistance.        Care EveryWhere ID     This is your Care EveryWhere ID. This could be used by other organizations to access your [place] medical records  [id number]        Your Vitals Were     Pulse Height Last Period BMI (Body Mass  "Index)          70 1.632 m (5' 4.25\") 08/09/2018 21.2 kg/m2         Blood Pressure from Last 3 Encounters:   10/11/18 106/69   05/31/18 111/70   04/27/18 107/65    Weight from Last 3 Encounters:   10/11/18 56.5 kg (124 lb 8 oz)   05/31/18 49.9 kg (110 lb)   04/27/18 51.4 kg (113 lb 4 oz)              We Performed the Following     Maternal Fetal Medicine Center Referral [9046.022]        Primary Care Provider Office Phone # Fax #    Jo Calloway [phone]       [address] [place] [address]  [facility] [address]        Equal Access to Services     JUAN CARLOS COTTO : Hadii glenn horneo Sunny, waaxda luqadaha, qaybta kaalmada adeegyada, shayy romo . So [facility] [phone].    ATENCIÓN: Si habla español, tiene a forde disposición servicios gratuitos de asistencia lingüística. Tiff al [phone].    We comply with applicable federal civil rights laws and [place] laws. We do not discriminate on the basis of race, color, national origin, age, disability, sex, sexual orientation, or gender identity.            Thank you!     Thank you for choosing [facility]  for your care. Our goal is always to provide you with excellent care. Hearing back from our patients is one way we can continue to improve our services. Please take a few minutes to complete the written survey that you may receive in the mail after your visit with us. Thank you!             Your Updated Medication List - Protect others around you: Learn how to safely use, store and throw away your medicines at www.disposemymeds.org.          This list is accurate as of 10/11/18  9:08 AM.  Always use your most recent med list.                   Brand Name Dispense Instructions for use Diagnosis    doxylamine 12.5 mg Tabs half-tab    UNISOM          OMEGA-3 FISH OIL PO       Travel advice encounter, Preventative health care       PRENATAL VITAMIN PO           PROBIOTIC PO           pyridOXINE 25 MG " tablet    vitamin B-6

## 2018-10-15 ENCOUNTER — AMBULATORY - HEALTHEAST (OUTPATIENT)
Dept: MATERNAL FETAL MEDICINE | Facility: HOSPITAL | Age: 28
End: 2018-10-15

## 2018-10-15 DIAGNOSIS — Z34.90 PRENATAL CARE, ANTEPARTUM: ICD-10-CM

## 2018-10-22 ENCOUNTER — TELEPHONE (OUTPATIENT)
Dept: MATERNAL FETAL MEDICINE | Facility: CLINIC | Age: 28
End: 2018-10-22

## 2018-10-22 ENCOUNTER — COMMUNICATION - HEALTHEAST (OUTPATIENT)
Dept: MATERNAL FETAL MEDICINE | Facility: HOSPITAL | Age: 28
End: 2018-10-22

## 2018-10-22 NOTE — TELEPHONE ENCOUNTER
Called patient to discuss the process and procedure for NIPT testing. Left call back number at [facility] office ([phone])  and my main office number ([phone]). All patients electing to proceed with genetic screening are required to have genetic counseling visit to provide informed consent (review benefits and limitations), review test performance, and track results. Per scheduling, the patient just wants to come in and  requisition and kit and does not want to meet with a genetic counselor.    I also contacted the patient's primary OB Gyn Clinic and spoke with triage nurse to review patient's requests.      Ciera Ulrich MS, [facility]  Maternal Fetal Medicine  Phone:[phone]  Phone: [phone]  Email: [email or web address]

## 2018-10-24 ENCOUNTER — TELEPHONE (OUTPATIENT)
Dept: OBGYN | Facility: CLINIC | Age: 28
End: 2018-10-24

## 2018-10-24 DIAGNOSIS — N30.00 ACUTE CYSTITIS WITHOUT HEMATURIA: Primary | ICD-10-CM

## 2018-10-24 RX ORDER — GRANULES FOR ORAL 3 G/1
3 POWDER ORAL ONCE
Qty: 1 PACKET | Refills: 0 | Status: SHIPPED | OUTPATIENT
Start: 2018-10-24 | End: 2018-10-24

## 2018-10-25 ENCOUNTER — TELEPHONE (OUTPATIENT)
Dept: MATERNAL FETAL MEDICINE | Facility: CLINIC | Age: 28
End: 2018-10-25

## 2018-10-25 ENCOUNTER — COMMUNICATION - HEALTHEAST (OUTPATIENT)
Dept: MATERNAL FETAL MEDICINE | Facility: HOSPITAL | Age: 28
End: 2018-10-25

## 2018-10-25 NOTE — TELEPHONE ENCOUNTER
Tatum returned my call to discuss the genetic counseling visit and NIPT. Patient does not want to come in for genetic counseling appointment and only wants to  paperwork for her NIPT blood draw. I explained that genetic counseling appointment is required for testing to discuss the benefits and limitations of NIPT, especially in a low risk patient, to provide information for billing,complete appropriate paperwork and to track and report results.    Even after reviewing the purpose of a GC consent visit, patient states she does not want GC and only wants lab draw appointment. She further states she can access this information for herself if she needs it.      I contacted the patient's primary ob clinic ([facility]) and spoke with triage nurse, Marlene, and to let her know we cannot facilitate this request for the patient without scheduling a GC appointment. I asked that they reach out to the patient to discuss the GC appointment or help facilitate other options.      Ciera Ulrich MS, [facility]  Maternal Fetal Medicine  [facility]  Phone:[phone]  Phone: [phone]  Email: [email or web address]

## 2018-10-26 ENCOUNTER — COMMUNICATION - HEALTHEAST (OUTPATIENT)
Dept: FAMILY MEDICINE | Facility: CLINIC | Age: 28
End: 2018-10-26

## 2018-10-26 ENCOUNTER — TRANSFERRED RECORDS (OUTPATIENT)
Dept: HEALTH INFORMATION MANAGEMENT | Facility: CLINIC | Age: 28
End: 2018-10-26

## 2018-10-26 ENCOUNTER — TELEPHONE (OUTPATIENT)
Dept: OBGYN | Facility: CLINIC | Age: 28
End: 2018-10-26

## 2018-10-26 ENCOUNTER — COMMUNICATION - HEALTHEAST (OUTPATIENT)
Dept: MATERNAL FETAL MEDICINE | Facility: HOSPITAL | Age: 28
End: 2018-10-26

## 2018-10-26 ENCOUNTER — TELEPHONE (OUTPATIENT)
Dept: MATERNAL FETAL MEDICINE | Facility: CLINIC | Age: 28
End: 2018-10-26

## 2018-10-26 ENCOUNTER — AMBULATORY - HEALTHEAST (OUTPATIENT)
Dept: MATERNAL FETAL MEDICINE | Facility: HOSPITAL | Age: 28
End: 2018-10-26

## 2018-10-26 NOTE — TELEPHONE ENCOUNTER
Debi,   I spoke with the patient and let her know that the CNM's are not able to order the test.  We refer our patients to [facility] who counsels and orders the NIPT.   I do not give permission to order this test under my name.  Would one of your providers be willing to order and follow through with results?   Respectfully,   TIGRE Al CN       Aishwarya, what are your thoughts on this?  Do you guys see this at [facility]?     Debi KOHLER R.N.  [facility]

## 2018-10-26 NOTE — TELEPHONE ENCOUNTER
Spoke with patient via phone today to discuss NIPT.  Previous referral made to [facility] for NIPT at patient request.  [facility] will not order NIPT without genetic counseling appointment.  Patient is low risk and insurance will not cover NIPT, but patient willing to pay for testing out of pocket.  Patient is not willing to meet with genetic counselor in order to have labs drawn.      Discussed that the [facility] group is unable to place isolated order for NIPT, our process is to refer to [facility].  Patient considering contacting outside clinics to see if lab work can be ordered without genetic screening.     TIGRE Al [facility]

## 2018-10-26 NOTE — TELEPHONE ENCOUNTER
Patient returned my call asking the cost of GC CPT code 43370. I encouraged her to contact her insurance to see if testing is covered. She stated she already knows it is not covered. I provided her with the billing number for billing/coding at  ([phone]). Patient is upset and requested that I transfer her. Call transferred.    Ciera Ulrich MS, [facility]

## 2018-10-26 NOTE — TELEPHONE ENCOUNTER
Called patient and left a message that we are unable to cancel the GC appointment for NIPT as this is [facility] policy that patients undergoing genetic screening are scheduled for a GC appointment for consent. I also contacted patient's primary OB clinic yesterday spoke with triage RN to let them know that GC is required for NIPT. I left a voicemail with triage RN line again this morning.    Ciera Ulrich MS, [facility]  Maternal Fetal Medicine  HE Phone (M,W,F): [phone]  Main Phone:[phone]

## 2018-10-26 NOTE — TELEPHONE ENCOUNTER
Pt would like to come to [facility] for the Innatal test through [employer].      We did discuss billing and we have resources on hand for further questions.    Dolores LANDEROS CNM, is it okay to order this lab test under your name?     The results will come to me, OB triage nurse at [facility], and then I can forward them to you via fax.    Let me know what your thoughts are on this.    Dbei KOHLER R.N.  [facility]

## 2018-10-28 NOTE — TELEPHONE ENCOUNTER
Bharathi Chairez    I will need to check with Jyoti Mcgraw to make sure this is OK to do, since she is not a [facility] patient.  Once I hear from Jyoti, then I will let you know.      Thanks!  Aishwarya LANDEROS CNM

## 2018-10-29 ENCOUNTER — ALLIED HEALTH/NURSE VISIT (OUTPATIENT)
Dept: NURSING | Facility: CLINIC | Age: 28
End: 2018-10-29
Payer: COMMERCIAL

## 2018-10-29 DIAGNOSIS — Z34.01 ENCOUNTER FOR SUPERVISION OF NORMAL FIRST PREGNANCY IN FIRST TRIMESTER: ICD-10-CM

## 2018-10-29 DIAGNOSIS — Z13.79 GENETIC SCREENING: Primary | ICD-10-CM

## 2018-10-29 LAB
ABO + RH BLD: NORMAL
ABO + RH BLD: NORMAL
BASOPHILS # BLD AUTO: 0 10E9/L (ref 0–0.2)
BASOPHILS NFR BLD AUTO: 0.2 %
BLD GP AB SCN SERPL QL: NORMAL
BLOOD BANK CMNT PATIENT-IMP: NORMAL
DIFFERENTIAL METHOD BLD: NORMAL
EOSINOPHIL # BLD AUTO: 0.1 10E9/L (ref 0–0.7)
EOSINOPHIL NFR BLD AUTO: 1.1 %
ERYTHROCYTE [DISTWIDTH] IN BLOOD BY AUTOMATED COUNT: 13.3 % (ref 10–15)
HCT VFR BLD AUTO: 39.6 % (ref 35–47)
HGB BLD-MCNC: 13.1 G/DL (ref 11.7–15.7)
LYMPHOCYTES # BLD AUTO: 2 10E9/L (ref 0.8–5.3)
LYMPHOCYTES NFR BLD AUTO: 18.9 %
MCH RBC QN AUTO: 30 PG (ref 26.5–33)
MCHC RBC AUTO-ENTMCNC: 33.1 G/DL (ref 31.5–36.5)
MCV RBC AUTO: 91 FL (ref 78–100)
MONOCYTES # BLD AUTO: 0.6 10E9/L (ref 0–1.3)
MONOCYTES NFR BLD AUTO: 6.1 %
NEUTROPHILS # BLD AUTO: 7.8 10E9/L (ref 1.6–8.3)
NEUTROPHILS NFR BLD AUTO: 73.7 %
PLATELET # BLD AUTO: 251 10E9/L (ref 150–450)
RBC # BLD AUTO: 4.36 10E12/L (ref 3.8–5.2)
SPECIMEN EXP DATE BLD: NORMAL
WBC # BLD AUTO: 10.5 10E9/L (ref 4–11)

## 2018-10-29 PROCEDURE — 86900 BLOOD TYPING SEROLOGIC ABO: CPT | Performed by: ADVANCED PRACTICE MIDWIFE

## 2018-10-29 PROCEDURE — 86706 HEP B SURFACE ANTIBODY: CPT | Performed by: OBSTETRICS & GYNECOLOGY

## 2018-10-29 PROCEDURE — 36415 COLL VENOUS BLD VENIPUNCTURE: CPT | Performed by: OBSTETRICS & GYNECOLOGY

## 2018-10-29 PROCEDURE — 86787 VARICELLA-ZOSTER ANTIBODY: CPT | Performed by: OBSTETRICS & GYNECOLOGY

## 2018-10-29 PROCEDURE — 85025 COMPLETE CBC W/AUTO DIFF WBC: CPT | Performed by: OBSTETRICS & GYNECOLOGY

## 2018-10-29 PROCEDURE — 86850 RBC ANTIBODY SCREEN: CPT | Performed by: ADVANCED PRACTICE MIDWIFE

## 2018-10-29 PROCEDURE — 86780 TREPONEMA PALLIDUM: CPT | Performed by: OBSTETRICS & GYNECOLOGY

## 2018-10-29 PROCEDURE — 82306 VITAMIN D 25 HYDROXY: CPT | Performed by: OBSTETRICS & GYNECOLOGY

## 2018-10-29 PROCEDURE — 87340 HEPATITIS B SURFACE AG IA: CPT | Performed by: OBSTETRICS & GYNECOLOGY

## 2018-10-29 PROCEDURE — 86901 BLOOD TYPING SEROLOGIC RH(D): CPT | Performed by: ADVANCED PRACTICE MIDWIFE

## 2018-10-29 PROCEDURE — 99207 ZZC NO CHARGE LOS: CPT

## 2018-10-29 PROCEDURE — 40000791 ZZHCL STATISTIC VERIFI PRENATAL TRISOMY 21,18,13: Mod: 90 | Performed by: OBSTETRICS & GYNECOLOGY

## 2018-10-29 PROCEDURE — 87389 HIV-1 AG W/HIV-1&-2 AB AG IA: CPT | Performed by: OBSTETRICS & GYNECOLOGY

## 2018-10-29 PROCEDURE — 99000 SPECIMEN HANDLING OFFICE-LAB: CPT | Performed by: OBSTETRICS & GYNECOLOGY

## 2018-10-29 PROCEDURE — 86762 RUBELLA ANTIBODY: CPT | Performed by: OBSTETRICS & GYNECOLOGY

## 2018-10-29 NOTE — TELEPHONE ENCOUNTER
Sent an email to clinic admin to see what we can do about this per pt request.    Debi KOHLER R.N.  [facility]

## 2018-10-29 NOTE — TELEPHONE ENCOUNTER
Domi Carrera,   This is Nori Bermudez CNM APRN   I was in Friday when Dolores Beckman CNM contacted the pt.  Yes , please place the order for the pt in order to get this test done.   Thanks,   Nori Bermudez CNM APRN   [phone] if you have any other questions.       Called Nori, we are checking into this to see how we can get the order signed.    Debi KOHLER R.N.  [facility]

## 2018-10-29 NOTE — TELEPHONE ENCOUNTER
Called pt.  She is very frustrated with this process.  Advised I can't have her come in for this test until I get on okay from someone to order it.      Jarod, are you okay with us ordering the [facility] Innatal blood test?  Pt thought that by your order to [facility] this would cover her, but I explained it is a different test so I still have to put in another order.      Debi KOHLER R.N.  [facility]

## 2018-10-30 LAB
DEPRECATED CALCIDIOL+CALCIFEROL SERPL-MC: 47 UG/L (ref 20–75)
HBV SURFACE AB SERPL IA-ACNC: 33.6 M[IU]/ML
HBV SURFACE AG SERPL QL IA: NONREACTIVE
HIV 1+2 AB+HIV1 P24 AG SERPL QL IA: NONREACTIVE

## 2018-10-31 ASSESSMENT — ENCOUNTER SYMPTOMS
FEVER: 0
DYSURIA: 0
FATIGUE: 1
DECREASED APPETITE: 0
NAUSEA: 1
WEIGHT GAIN: 1
POLYDIPSIA: 0
RECTAL PAIN: 0
DIARRHEA: 0
POLYPHAGIA: 0
WEIGHT LOSS: 0
BLOOD IN STOOL: 0
INCREASED ENERGY: 1
JAUNDICE: 0
BLOATING: 1
DIFFICULTY URINATING: 0
HALLUCINATIONS: 0
NIGHT SWEATS: 0
ALTERED TEMPERATURE REGULATION: 0
ABDOMINAL PAIN: 0
BOWEL INCONTINENCE: 0
CONSTIPATION: 0
HEMATURIA: 0
FLANK PAIN: 0
CHILLS: 0
VOMITING: 1
HEARTBURN: 0

## 2018-10-31 ASSESSMENT — ANXIETY QUESTIONNAIRES
7. FEELING AFRAID AS IF SOMETHING AWFUL MIGHT HAPPEN: NOT AT ALL
GAD7 TOTAL SCORE: 1
7. FEELING AFRAID AS IF SOMETHING AWFUL MIGHT HAPPEN: NOT AT ALL
6. BECOMING EASILY ANNOYED OR IRRITABLE: NOT AT ALL
5. BEING SO RESTLESS THAT IT IS HARD TO SIT STILL: SEVERAL DAYS
1. FEELING NERVOUS, ANXIOUS, OR ON EDGE: NOT AT ALL
GAD7 TOTAL SCORE: 1
3. WORRYING TOO MUCH ABOUT DIFFERENT THINGS: NOT AT ALL
4. TROUBLE RELAXING: NOT AT ALL
2. NOT BEING ABLE TO STOP OR CONTROL WORRYING: NOT AT ALL

## 2018-11-01 LAB
RUBV IGG SERPL IA-ACNC: 82 IU/ML
T PALLIDUM AB SER QL: NONREACTIVE
VZV IGG SER QL IA: 5.4 AI (ref 0–0.8)

## 2018-11-01 ASSESSMENT — ANXIETY QUESTIONNAIRES: GAD7 TOTAL SCORE: 1

## 2018-11-02 ENCOUNTER — TELEPHONE (OUTPATIENT)
Dept: OBGYN | Facility: CLINIC | Age: 28
End: 2018-11-02

## 2018-11-02 NOTE — TELEPHONE ENCOUNTER
Results received from [facility] testing in [place] triage.  Testing done:  Innatal Prenatal Screen    Action:  Spoke to pt and gave NORMAL results. (chromosome 21,18, 13, and sex chromosomes).  Results will be scanned into pt chart.  Sent to scanning.    Gender:  Gender revealed to pt on the phone. (BOY)    Routed to provider as FYI. (KT- this was the one that is not our pt that we ordered test for)    Ashutosh Floyd- next appt is with you that's why I sent you as well.      Debi KOHLER R.N.  [facility]

## 2018-11-05 NOTE — TELEPHONE ENCOUNTER
Ok thanks   Dr. Opal Alfred,     Obstetrics and Gynecology  [facility] [place] [facility] [place]

## 2018-11-07 ENCOUNTER — OFFICE VISIT (OUTPATIENT)
Dept: OBGYN | Facility: CLINIC | Age: 28
End: 2018-11-07
Attending: ADVANCED PRACTICE MIDWIFE
Payer: COMMERCIAL

## 2018-11-07 VITALS
WEIGHT: 127.2 LBS | HEIGHT: 64 IN | SYSTOLIC BLOOD PRESSURE: 110 MMHG | DIASTOLIC BLOOD PRESSURE: 72 MMHG | BODY MASS INDEX: 21.72 KG/M2 | HEART RATE: 78 BPM

## 2018-11-07 DIAGNOSIS — Z34.01 ENCOUNTER FOR SUPERVISION OF NORMAL FIRST PREGNANCY IN FIRST TRIMESTER: Primary | ICD-10-CM

## 2018-11-07 PROCEDURE — 87591 N.GONORRHOEAE DNA AMP PROB: CPT | Performed by: ADVANCED PRACTICE MIDWIFE

## 2018-11-07 PROCEDURE — 87491 CHLMYD TRACH DNA AMP PROBE: CPT | Performed by: ADVANCED PRACTICE MIDWIFE

## 2018-11-07 PROCEDURE — G0463 HOSPITAL OUTPT CLINIC VISIT: HCPCS | Mod: ZF

## 2018-11-07 ASSESSMENT — ANXIETY QUESTIONNAIRES
7. FEELING AFRAID AS IF SOMETHING AWFUL MIGHT HAPPEN: NOT AT ALL
5. BEING SO RESTLESS THAT IT IS HARD TO SIT STILL: SEVERAL DAYS
GAD7 TOTAL SCORE: 1
1. FEELING NERVOUS, ANXIOUS, OR ON EDGE: NOT AT ALL
6. BECOMING EASILY ANNOYED OR IRRITABLE: NOT AT ALL
3. WORRYING TOO MUCH ABOUT DIFFERENT THINGS: NOT AT ALL
2. NOT BEING ABLE TO STOP OR CONTROL WORRYING: NOT AT ALL

## 2018-11-07 ASSESSMENT — PATIENT HEALTH QUESTIONNAIRE - PHQ9
5. POOR APPETITE OR OVEREATING: NOT AT ALL
SUM OF ALL RESPONSES TO PHQ QUESTIONS 1-9: 5

## 2018-11-07 NOTE — PROGRESS NOTES
Subjective:   Tatum is a 28 year old female who presents to clinic for a new OB visit.   at 11w4d with Estimated Date of Delivery: May 25, 2019 based on US. Feels well. Has started PNV.     She has not had bleeding since her LMP.   She has had mild nausea, improved with B6 and unisom.  Weight loss has not occurred.   This was a planned pregnancy.   FOB is involved.     OTHER CONCERNS: restless legs, lower back pain, trouble sleeping  ===========================================  ROS: 10 point ROS neg other than the symptoms noted above in the HPI.    PSYCHIATRIC:  Denies mood changes  PHQ-9 score:    PHQ-9 SCORE 2018   Total Score 5     TUAN-7 SCORE 2016 10/31/2018 2018   Total Score 1 (minimal anxiety) 1 (minimal anxiety) -   Total Score - 1 1     Past History:  Her past medical history   Past Medical History:   Diagnosis Date     Fracture of finger      Fracture of foot      This is her first pregnancy  Since her last LMP she denies use of alcohol, tobacco and street drugs.  HISTORY:  Family History   Problem Relation Age of Onset     Breast Cancer Maternal Grandmother      Diabetes Maternal Grandmother      type II, lifestyle related     Social History     Social History     Marital status:      Spouse name: Aaron     Number of children: N/A     Years of education: N/A     Occupational History           [employer]     Social History Main Topics     Smoking status: Never Smoker     Smokeless tobacco: Never Used     Alcohol use Yes      Comment: Occasionally     Drug use: No     Sexual activity: Yes     Partners: Male     Birth control/ protection: None     Other Topics Concern     None     Social History Narrative    How much exercise per week? 4-5 x's, some walking currently    How much calcium per day? Prenatal vitamin        How much caffeine per day? none    How much vitamin D per day? supplement    Do you/your family wear seatbelts?  Yes    Do you/your  "family use safety helmets? n/a    Do you/your family use sunscreen? Yes    Do you/your family keep firearms in the home? Yes, hunting    Do you/your family have a smoke detector(s)? No        2018 Jeff Still LPN             Current Outpatient Prescriptions   Medication Sig     doxylamine (UNISOM) 12.5 mg TABS half-tab      Prenatal Vit-Fe Fumarate-FA (PRENATAL VITAMIN PO)      Probiotic Product (PROBIOTIC PO)      pyridOXINE (VITAMIN  B-6) 25 MG tablet      No current facility-administered medications for this visit.      Allergies   Allergen Reactions     Peggy-Be [Norethindrone (Contraceptive)] Hives       ============================================  MEDICAL HISTORY  Past Medical History:   Diagnosis Date     Fracture of finger      Fracture of foot      Past Surgical History:   Procedure Laterality Date     CLOSED REDUCTION, PERCUTANEOUS PINNING  HAND, COMBINED  2014    Procedure: COMBINED CLOSED REDUCTION, PERCUTANEOUS PINNING HAND;  Surgeon: Ayala Rabago MD;  Location:  OR      TOOTH EXTRACTION W/FORCEP      wisdom teeth       Obstetric History       T0      L0     SAB0   TAB0   Ectopic0   Multiple0   Live Births0       # Outcome Date GA Lbr Eliecer/2nd Weight Sex Delivery Anes PTL Lv   1 Current                     GYN History- Denies Abnormal Pap Smears                        Cervical procedures: none                        History of STI: denies    I personally reviewed the past social/family/medical and surgical history on the date of service.   I reviewed lab work done at Intake visit with patient.    EXAM:  /72 (BP Location: Left arm, Patient Position: Chair)  Pulse 78  Ht 1.626 m (5' 4\")  Wt 57.7 kg (127 lb 3.2 oz)  LMP 2018  BMI 21.83 kg/m2   EXAM:  GENERAL:  Pleasant pregnant female, alert, cooperative and well groomed.  SKIN:  Warm and dry, without lesions or rashes  HEAD: Symmetrical features.  MOUTH:  Buccal mucosa pink, moist without " lesions.  Teeth in good repair.    NECK:  Thyroid without enlargement and nodules.  Lymph nodes not palpable.  LUNGS:  Clear to auscultation.  BREAST:    No dominant, fixed or suspicious masses are noted.  No skin or nipple changes or axillary nodes.   Nipples everted.      HEART:  RRR without murmur.  ABDOMEN: Soft without masses , tenderness or organomegaly.  No CVA tenderness.  Uterus not palpable appropriate for dates.  No scars noted.. Fetal heart tones present.  MUSCULOSKELETAL:  Full range of motion  EXTREMITIES:  No edema. No significant varicosities.   PELVIC EXAM: Deferred  WET PREP:Not done  GC/CHLAMYDIA CULTURE OBTAINED:YES    Lab Results   Component Value Date    PAP NIL 2016      Assessment:  28 year old , 11w4d weeks of pregnancy with ALLEGRA of May 25, 2019 by US   Genetic Screening: First Trimester Screen, Level II    ICD-10-CM    1. Encounter for supervision of normal first pregnancy in first trimester, LORI DENIS Z34.01 Gonorrhea PCR     Chlamydia PCR (Clinic Collect)     Plan:  - Reviewed use of triage nurse line and contacting the on-call provider after hours for an urgent need such as fever, vagina bleeding, bladder or vaginal infection, rupture of membranes,  or term labor.    - Reviewed best evidence for: weight gain for her weight and height for pregnancy:  Based on pre-pregnancy Body mass index is 21.83 kg/(m^2). RECOMMENDED WEIGHT GAIN: 15-25 lbs.  - Reviewed healthy diet and foods to avoid; exercise and activity during pregnancy; avoiding exposure to toxoplasmosis; and maintenance of a generally healthy lifestyle.   - Discussed the harms, benefits, side effects and alternative therapies for current prescribed and OTC medications.  - All pt's questions discussed and answered.  Pt verbalized understanding of and agreement to plan of care.   - Reviewed relief measures for lower back pain and restless legs  - Continue scheduled prenatal care, RTC in 4-6 weeks and prn if questions  or concerns

## 2018-11-07 NOTE — MR AVS SNAPSHOT
After Visit Summary   11/7/2018    Tatum Cooper    MRN: [id number]           Patient Information     Date Of Birth          1990        Visit Information        Provider Department      11/7/2018 8:15 AM Ashutosh Floyd APRN CNM [facility]        Today's Diagnoses     Encounter for supervision of normal first pregnancy in first trimester, [facility] CNM    -  1       Follow-ups after your visit        Follow-up notes from your care team     Return in about 4 weeks (around 12/5/2018) for ALLISON Visit.      Your next 10 appointments already scheduled     Dec 14, 2018  8:15 AM CST   Return Visit with TIGRE Grijalva CNM   [facility] ([facility])    [facility] [address]  [address]  [address]  [facility] [address] [phone]              Who to contact     Please call your clinic at [phone] to:    Ask questions about your health    Make or cancel appointments    Discuss your medicines    Learn about your test results    Speak to your doctor            Additional Information About Your Visit        [employer] Information     [employer] gives you secure access to your electronic health record. If you see a primary care provider, you can also send messages to your care team and make appointments. If you have questions, please call your primary care clinic.  If you do not have a primary care provider, please call [phone] and they will assist you.      [employer] is an electronic gateway that provides easy, online access to your medical records. With [employer], you can request a clinic appointment, read your test results, renew a prescription or communicate with your care team.     To access your existing account, please contact your [facility] or call [phone] for assistance.        Care EveryWhere ID     This is your Care EveryWhere ID. This could be used by other organizations to access  "your [facility] medical records  [id number]        Your Vitals Were     Pulse Height Last Period BMI (Body Mass Index)          78 1.626 m (5' 4\") 08/09/2018 21.83 kg/m2         Blood Pressure from Last 3 Encounters:   11/07/18 110/72   10/11/18 106/69   05/31/18 111/70    Weight from Last 3 Encounters:   11/07/18 57.7 kg (127 lb 3.2 oz)   10/11/18 56.5 kg (124 lb 8 oz)   05/31/18 49.9 kg (110 lb)              We Performed the Following     Chlamydia PCR (Clinic Collect)     Gonorrhea PCR        Primary Care Provider Office Phone # Fax #    Jo Calloway [phone]       [address] [address]        Equal Access to Services     [facility] : Hadii glenn bledsoe Sobill, waaxda luqadaha, qaybta kaalmada ashwin, shayy romo . So [facility] [phone].    ATENCIÓN: Si habla español, tiene a forde disposición servicios gratuitos de asistencia lingüística. Tiff al [phone].    We comply with applicable federal civil rights laws and [place] laws. We do not discriminate on the basis of race, color, national origin, age, disability, sex, sexual orientation, or gender identity.            Thank you!     Thank you for choosing [facility]  for your care. Our goal is always to provide you with excellent care. Hearing back from our patients is one way we can continue to improve our services. Please take a few minutes to complete the written survey that you may receive in the mail after your visit with us. Thank you!             Your Updated Medication List - Protect others around you: Learn how to safely use, store and throw away your medicines at www.disposemymeds.org.          This list is accurate as of 11/7/18  8:59 AM.  Always use your most recent med list.                   Brand Name Dispense Instructions for use Diagnosis    doxylamine 12.5 mg Tabs half-tab    UNISOM          PRENATAL VITAMIN PO           PROBIOTIC PO     "       pyridOXINE 25 MG tablet    vitamin B-6

## 2018-11-07 NOTE — NURSING NOTE
Chief Complaint   Patient presents with     Prenatal Care     11w4d       See PATRICK Bah 11/7/2018

## 2018-11-07 NOTE — LETTER
2018       RE: Tatum Cooper  [address] [facility] [address]     Dear Colleague,    Thank you for referring your patient, Tatum Cooper, to the [facility] at [facility]. Please see a copy of my visit note below.    Subjective:   Tatum is a 28 year old female who presents to clinic for a new OB visit.   at 11w4d with Estimated Date of Delivery: May 25, 2019 based on US. Feels well. Has started PNV.     She has not had bleeding since her LMP.   She has had mild nausea, improved with B6 and unisom.  Weight loss has not occurred.   This was a planned pregnancy.   FOB is involved.     OTHER CONCERNS: restless legs, lower back pain, trouble sleeping  ===========================================    PSYCHIATRIC:  Denies mood changes  PHQ-9 score:    PHQ-9 SCORE 2018   Total Score 5     TUAN-7 SCORE 2016 10/31/2018 2018   Total Score 1 (minimal anxiety) 1 (minimal anxiety) -   Total Score - 1 1     Past History:  Her past medical history   Past Medical History:   Diagnosis Date     Fracture of finger      Fracture of foot      This is her first pregnancy  Since her last LMP she denies use of alcohol, tobacco and street drugs.  HISTORY:  Family History   Problem Relation Age of Onset     Breast Cancer Maternal Grandmother      Diabetes Maternal Grandmother      type II, lifestyle related     Social History     Social History     Marital status:      Spouse name: Aaron     Number of children: N/A     Years of education: N/A     Occupational History           [employer]     Social History Main Topics     Smoking status: Never Smoker     Smokeless tobacco: Never Used     Alcohol use Yes      Comment: Occasionally     Drug use: No     Sexual activity: Yes     Partners: Male     Birth control/ protection: None     Other Topics Concern     None     Social History Narrative    How much exercise per  "week? 4-5 x's, some walking currently    How much calcium per day? Prenatal vitamin        How much caffeine per day? none    How much vitamin D per day? supplement    Do you/your family wear seatbelts?  Yes    Do you/your family use safety helmets? n/a    Do you/your family use sunscreen? Yes    Do you/your family keep firearms in the home? Yes, hunting    Do you/your family have a smoke detector(s)? No        2018 Jeff Still LPN             Current Outpatient Prescriptions   Medication Sig     doxylamine (UNISOM) 12.5 mg TABS half-tab      Prenatal Vit-Fe Fumarate-FA (PRENATAL VITAMIN PO)      Probiotic Product (PROBIOTIC PO)      pyridOXINE (VITAMIN  B-6) 25 MG tablet      No current facility-administered medications for this visit.      Allergies   Allergen Reactions     Peggy-Be [Norethindrone (Contraceptive)] Hives       ============================================  MEDICAL HISTORY  Past Medical History:   Diagnosis Date     Fracture of finger      Fracture of foot      Past Surgical History:   Procedure Laterality Date     CLOSED REDUCTION, PERCUTANEOUS PINNING  HAND, COMBINED  2014    Procedure: COMBINED CLOSED REDUCTION, PERCUTANEOUS PINNING HAND;  Surgeon: Ayala Rabago MD;  Location:  OR      TOOTH EXTRACTION W/FORCEP      wisdom teeth       Obstetric History       T0      L0     SAB0   TAB0   Ectopic0   Multiple0   Live Births0       # Outcome Date GA Lbr Eliecer/2nd Weight Sex Delivery Anes PTL Lv   1 Current                     GYN History- Denies Abnormal Pap Smears                        Cervical procedures: none                        History of STI: denies    I personally reviewed the past social/family/medical and surgical history on the date of service.   I reviewed lab work done at Intake visit with patient.    EXAM:  /72 (BP Location: Left arm, Patient Position: Chair)  Pulse 78  Ht 1.626 m (5' 4\")  Wt 57.7 kg (127 lb 3.2 oz)  LMP 2018 "  BMI 21.83 kg/m2   EXAM:  GENERAL:  Pleasant pregnant female, alert, cooperative and well groomed.  SKIN:  Warm and dry, without lesions or rashes  HEAD: Symmetrical features.  MOUTH:  Buccal mucosa pink, moist without lesions.  Teeth in good repair.    NECK:  Thyroid without enlargement and nodules.  Lymph nodes not palpable.  LUNGS:  Clear to auscultation.  BREAST:    No dominant, fixed or suspicious masses are noted.  No skin or nipple changes or axillary nodes.   Nipples everted.      HEART:  RRR without murmur.  ABDOMEN: Soft without masses , tenderness or organomegaly.  No CVA tenderness.  Uterus not palpable appropriate for dates.  No scars noted.. Fetal heart tones present.  MUSCULOSKELETAL:  Full range of motion  EXTREMITIES:  No edema. No significant varicosities.   PELVIC EXAM: Deferred  WET PREP:Not done  GC/CHLAMYDIA CULTURE OBTAINED:YES    Lab Results   Component Value Date    PAP NIL 2016      Assessment:  28 year old , 11w4d weeks of pregnancy with ALLEGRA of May 25, 2019 by US   Genetic Screening: First Trimester Screen, Level II    ICD-10-CM    1. Encounter for supervision of normal first pregnancy in first trimester, AMADO DENIS Z34.01 Gonorrhea PCR     Chlamydia PCR (Clinic Collect)     Plan:  - Reviewed use of triage nurse line and contacting the on-call provider after hours for an urgent need such as fever, vagina bleeding, bladder or vaginal infection, rupture of membranes,  or term labor.    - Reviewed best evidence for: weight gain for her weight and height for pregnancy:  Based on pre-pregnancy Body mass index is 21.83 kg/(m^2). RECOMMENDED WEIGHT GAIN: 15-25 lbs.  - Reviewed healthy diet and foods to avoid; exercise and activity during pregnancy; avoiding exposure to toxoplasmosis; and maintenance of a generally healthy lifestyle.   - Discussed the harms, benefits, side effects and alternative therapies for current prescribed and OTC medications.  - All pt's questions  discussed and answered.  Pt verbalized understanding of and agreement to plan of care.   - Reviewed relief measures for lower back pain and restless legs  - Continue scheduled prenatal care, RTC in 4-6 weeks and prn if questions or concerns    Again, thank you for allowing me to participate in the care of your patient.      Sincerely,    TIGRE RinconM

## 2018-11-08 LAB
C TRACH DNA SPEC QL NAA+PROBE: NEGATIVE
N GONORRHOEA DNA SPEC QL NAA+PROBE: NEGATIVE
SPECIMEN SOURCE: NORMAL
SPECIMEN SOURCE: NORMAL

## 2018-12-14 ENCOUNTER — OFFICE VISIT (OUTPATIENT)
Dept: OBGYN | Facility: CLINIC | Age: 28
End: 2018-12-14
Attending: ADVANCED PRACTICE MIDWIFE
Payer: COMMERCIAL

## 2018-12-14 VITALS
HEART RATE: 77 BPM | SYSTOLIC BLOOD PRESSURE: 112 MMHG | DIASTOLIC BLOOD PRESSURE: 73 MMHG | WEIGHT: 130.8 LBS | BODY MASS INDEX: 22.45 KG/M2

## 2018-12-14 DIAGNOSIS — Z34.02 ENCOUNTER FOR SUPERVISION OF NORMAL FIRST PREGNANCY IN SECOND TRIMESTER: Primary | ICD-10-CM

## 2018-12-14 PROCEDURE — G0463 HOSPITAL OUTPT CLINIC VISIT: HCPCS | Mod: ZF

## 2018-12-14 PROCEDURE — 87086 URINE CULTURE/COLONY COUNT: CPT | Performed by: ADVANCED PRACTICE MIDWIFE

## 2018-12-14 NOTE — PROGRESS NOTES
Subjective:      28 year old  at 16w6d presents for a routine prenatal appointment.  Denies vaginal bleeding or leakage of fluid.  Denies contractions or cramping.   Denies feeling fetal movement.     No HA, visual changes, RUQ or epigastric pain.   The patient presents with the following concerns: Weight gain. Concerned about additional #5 narciso prior to initial OB visit. Nausea with occasional vomiting, taking Unisom occasionally with mild relief. Doing prenatal massage for mild lower back pain. Restless legs is back to prepregnancy level and regular exercise and massage help.   Level II US  Scheduled with [facility]. LOC/Care Everywhere signed.   NIPT done at outside clinic, WNL, baby boy!       - restless leg    Objective:  Vitals:    18 0823   BP: 112/73   BP Location: Left arm   Patient Position: Chair   Pulse: 77   Weight: 59.3 kg (130 lb 12.8 oz)     See OB flowsheet    Assessment/Plan     Encounter Diagnosis   Name Primary?     Encounter for supervision of normal first pregnancy in second trimester - [facility] CNM Yes     (Z34.02) Encounter for supervision of normal first pregnancy in second trimester - [facility] ADEEL  (primary encounter diagnosis)  Plan: Urine Culture Aerobic Bacterial        Orders Placed This Encounter   Medications     Fish Oil-Cholecalciferol (OMEGA-3 + D PO)       - Reviewed total weight gain. Discussed that she is on target. Continue healthy diet and exercise.      - Reviewed why/how to contact provider.    Patient education/orders or handouts today:PTL signs/symptoms, Fetal movement and Level 2 u/s scheduled at [place].    Return to clinic in 4 weeks and prn if questions or concerns.     I, Aishwarya Pena, am serving as a scribe; to document services personally performed by  Nannette Juarez CNM based on data collection and the provider's statements to me.   Aishwarya Pena    The encounter was performed by me and scribed by the [facility]. The scribed note accurately reflects my personal  services and decisions made by me. TIGRE MelendezM

## 2018-12-14 NOTE — LETTER
2018       RE: Tatum Cooper  [address] [facility] [address]     Dear Colleague,    Thank you for referring your patient, Tatum Cooper, to the [facility] at [facility]. Please see a copy of my visit note below.    Subjective:      28 year old  at 16w6d presents for a routine prenatal appointment.  Denies vaginal bleeding or leakage of fluid.  Denies contractions or cramping.   Denies feeling fetal movement.     No HA, visual changes, RUQ or epigastric pain.   The patient presents with the following concerns: Weight gain. Concerned about additional #5 narciso prior to initial OB visit. Nausea with occasional vomiting, taking Unisom occasionally with mild relief. Doing prenatal massage for mild lower back pain. Restless legs is back to prepregnancy level and regular exercise and massage help.   Level II US  Scheduled with [facility]. LOC/Care Everywhere signed.   NIPT done at outside clinic, WNL, baby boy!       - restless leg    Objective:  Vitals:    18 0823   BP: 112/73   BP Location: Left arm   Patient Position: Chair   Pulse: 77   Weight: 59.3 kg (130 lb 12.8 oz)     See OB flowsheet    Assessment/Plan     Encounter Diagnosis   Name Primary?     Encounter for supervision of normal first pregnancy in second trimester - [facility] CNM Yes     (Z34.02) Encounter for supervision of normal first pregnancy in second trimester - [facility] CNM  (primary encounter diagnosis)  Plan: Urine Culture Aerobic Bacterial    Orders Placed This Encounter   Medications     Fish Oil-Cholecalciferol (OMEGA-3 + D PO)     - Reviewed total weight gain. Discussed that she is on target. Continue healthy diet and exercise.      - Reviewed why/how to contact provider.    Patient education/orders or handouts today:PTL signs/symptoms, Fetal movement and Level 2 u/s scheduled at [place].    Return to clinic in 4 weeks and prn if questions or concerns.     I,  Aishwarya Pena, am serving as a scribe; to document services personally performed by  Nannette Juarez CNM based on data collection and the provider's statements to me.   Aishwarya Pena    The encounter was performed by me and scribed by the SNM. The scribed note accurately reflects my personal services and decisions made by me. TIGRE Melendez CNM

## 2018-12-14 NOTE — NURSING NOTE
Chief Complaint   Patient presents with     Prenatal Care     16w6d       See PATRICK Bah 12/14/2018

## 2018-12-15 LAB
BACTERIA SPEC CULT: NO GROWTH
Lab: NORMAL
SPECIMEN SOURCE: NORMAL

## 2018-12-26 ENCOUNTER — OFFICE VISIT - HEALTHEAST (OUTPATIENT)
Dept: MATERNAL FETAL MEDICINE | Facility: HOSPITAL | Age: 28
End: 2018-12-26

## 2018-12-26 ENCOUNTER — RECORDS - HEALTHEAST (OUTPATIENT)
Dept: ULTRASOUND IMAGING | Facility: HOSPITAL | Age: 28
End: 2018-12-26

## 2018-12-26 ENCOUNTER — HOSPITAL ENCOUNTER (OUTPATIENT)
Dept: CARDIOLOGY | Facility: CLINIC | Age: 28
Discharge: HOME | End: 2018-12-26
Attending: OBSTETRICS & GYNECOLOGY | Admitting: OBSTETRICS & GYNECOLOGY
Payer: COMMERCIAL

## 2018-12-26 ENCOUNTER — TRANSFERRED RECORDS (OUTPATIENT)
Dept: HEALTH INFORMATION MANAGEMENT | Facility: CLINIC | Age: 28
End: 2018-12-26

## 2018-12-26 ENCOUNTER — RECORDS - HEALTHEAST (OUTPATIENT)
Dept: ADMINISTRATIVE | Facility: OTHER | Age: 28
End: 2018-12-26

## 2018-12-26 DIAGNOSIS — Z36.89 ENCOUNTER FOR FETAL ANATOMIC SURVEY: ICD-10-CM

## 2018-12-26 DIAGNOSIS — Z34.90 ENCOUNTER FOR SUPERVISION OF NORMAL PREGNANCY, UNSPECIFIED, UNSPECIFIED TRIMESTER: ICD-10-CM

## 2018-12-26 DIAGNOSIS — O35.9XX0 SUSPECTED FETAL ANOMALY, ANTEPARTUM, SINGLE OR UNSPECIFIED FETUS: Primary | ICD-10-CM

## 2018-12-26 DIAGNOSIS — O35.9XX0 SUSPECTED FETAL ANOMALY, ANTEPARTUM, SINGLE OR UNSPECIFIED FETUS: ICD-10-CM

## 2018-12-26 PROCEDURE — 76827 ECHO EXAM OF FETAL HEART: CPT

## 2018-12-26 NOTE — CONSULTS
[facility]   Heart Center Fetal Consult Note    Patient:  Tatum Cooper MRN:  [id number]   YOB: 1990 Age:  28 year old   Date of Visit:  2018 PCP:  Jo Calloway MD     Dear Dr. Matthew:    I had the pleasure of seeing Tatum Cooper at the [facility] on 2018 in fetal cardiology consultation today. As you know, she is a 28 year old  at 18w4d who presented for fetal echocardiogram today because of concern for TGA on OB US.    I performed and interpreted the fetal echocardiogram today, which demonstrated normal fetal cardiac anatomy, ventricular size and systolic function as well as normal heart rate and rhythm. Normally related great arteries.    The  findings on today's exam were discussed. The routine limitations of fetal echocardiography were also reviewed, namely the inability to rule out small defects of the atrial or ventricular septum, coarctation of the aorta, minor valve abnormalities, or partial anomalous pulmonary venous return.     No additional fetal echocardiograms required, unless new concerns arise.    I appreciate the opportunity of participating in Tatum Cooper 's care.    This visit was separate from the performance and interpretation of the ultrasound. The majority of the time (>50%) was spent in counseling and coordination of care. I spent  20 minutes in face-to-face time reviewing the above considerations.    Brie Dent MD  [facility]  Pediatric Cardiology  [facility], 5th floor, [facility] [address]  Phone # [phone]  Pager # [phone]  Fax [phone]

## 2018-12-28 ENCOUNTER — TELEPHONE (OUTPATIENT)
Dept: MATERNAL FETAL MEDICINE | Facility: CLINIC | Age: 28
End: 2018-12-28

## 2018-12-28 NOTE — TELEPHONE ENCOUNTER
I called the patient to discuss testing for diGeorge syndrome that is available prenatally.  The patient was informed of the low positive predictive value of cell free DNA screening, and that amniocentesis would be the preferred test.  The patient is not interested in testing if a definitive answer cannot be obtained, but also does not wish to pursue an amniocentesis at this time.  The patient was counseled by Dr. Beck in cardiology that follow up postnally will consist of an echocardiogram after birth, and if a vascular ring or aberrant subclavian is seen, testing for diGeorge will be ordered at that time.  At the conclusion of our conversation the patient indicated her questions had been answered.    Dolores Rondon MD  Maternal Fetal Medicine

## 2018-12-28 NOTE — PROGRESS NOTES
Addendum note to consultation from 18    Reviewed images from fetal echo cardiogram from 18 and noted right aortic arch. Cannot rule-out vascular ring with the images obtained.    Results discussed with Dr Dolores Rondon and with patient Tatum Cooper over the phone on 2018. Tatum is planning to deliver at [facility].      Recommendations:  - Post- echocardiogram within first 24 hours of life if possible to rule out vascular ring while PDA is still patent  - If vascular ring is present, please consult pediatric cardiology  - Consider post-hansa testing for DiGeorge syndrome if vascular ring is present  - No further fetal echocardiograms needed    Brie Dent MD  [facility]  Pediatric Cardiology    Pager # [phone]  Phone # [phone]

## 2018-12-31 LAB — NON INVASIVE PRENATAL TEST CELL FREE DNA: NORMAL

## 2019-01-21 ENCOUNTER — OFFICE VISIT (OUTPATIENT)
Dept: OBGYN | Facility: CLINIC | Age: 29
End: 2019-01-21
Attending: ADVANCED PRACTICE MIDWIFE
Payer: COMMERCIAL

## 2019-01-21 VITALS
WEIGHT: 137 LBS | SYSTOLIC BLOOD PRESSURE: 101 MMHG | BODY MASS INDEX: 23.52 KG/M2 | DIASTOLIC BLOOD PRESSURE: 68 MMHG | HEART RATE: 76 BPM

## 2019-01-21 DIAGNOSIS — Z34.02 ENCOUNTER FOR SUPERVISION OF NORMAL FIRST PREGNANCY IN SECOND TRIMESTER: Primary | ICD-10-CM

## 2019-01-21 DIAGNOSIS — O92.70 LACTATION DISORDER: ICD-10-CM

## 2019-01-21 PROCEDURE — G0463 HOSPITAL OUTPT CLINIC VISIT: HCPCS | Mod: ZF

## 2019-01-21 RX ORDER — BREAST PUMP
1 EACH MISCELLANEOUS PRN
Qty: 1 EACH | Refills: 0 | Status: SHIPPED | OUTPATIENT
Start: 2019-01-21 | End: 2019-04-26

## 2019-01-21 ASSESSMENT — PAIN SCALES - GENERAL: PAINLEVEL: NO PAIN (0)

## 2019-01-21 NOTE — PROGRESS NOTES
"Subjective:      28 year old  at 22w2d presents for a routine prenatal appointment.         Denies cramping/contractions, vaginal bleeding, discharge or leakage of fluid. Reports +fetal movement.  No HA, vision changes, ruq/epigastric pain.       Patient concerns: Feeling well overall. Nausea has improved- still occurs randomly but not often. Has been exercising- goes to [employer] and yoga.   - Had level 2 ultrasound on - placenta anterior, having a boy! Concern d/t abnormal 3 vessel and 3 vessel tracheal views so fetal echocardiogram was performed. Echo showed right aortic arch. Recommendation for \"non urgent  echo within 24 hours of life.\" Pt states they are coping well but that the \"unknown\" is stressful. They have been counseled on possibility of DiGeorge syndrome. Reports she almost wishes they had not found out all this information but feel they are handling it well.   - Pt has some concern about baby's size at birth d/t  being over 9lbs as a baby.  - Questions about diastasis recti and core strengthening postpartum.  - Would like a breast pump prescription today.  - Does not want to drink the glucose at EOB. Agreeable to apple juice alternative.     Objective:  Vitals:    19 0831   BP: 101/68   Pulse: 76   Weight: 62.1 kg (137 lb)       See OB flowsheet    Assessment/Plan     Encounter Diagnosis   Name Primary?     Encounter for supervision of normal first pregnancy in second trimester - [facility] CNM Yes     Orders Placed This Encounter   Medications     Misc. Devices (BREAST PUMP) MISC     Si each as needed (as needed)     Dispense:  1 each     Refill:  0       - Reviewed total weight gain, encouraged continued healthy diet and exercise.      - Reviewed why/how to contact provider.      Patient education/orders or handouts today:  fetal movement and Plan for EOB visit w labs    Reviewed level 2 ultrasound and fetal echocardiogram.  Plan EOB next visit.   Pt declines glucose " drink. Agreeable to apple juice alternative.     Continue scheduled prenatal care, RTC in 5 weeks for EOB and prn if questions or concerns.      TIGRE Luo, CNM

## 2019-01-21 NOTE — NURSING NOTE
Chief Complaint   Patient presents with     Prenatal Care   22 weeks ob visit  Would  To talk about alternative glucose choice.

## 2019-01-21 NOTE — LETTER
"  RE: Tatum Cooper  [address] [facility] [address]     Dear Colleague,    Thank you for referring your patient, Tatum Cooper, to the [facility] at [facility]. Please see a copy of my visit note below.    Subjective:      28 year old  at 22w2d presents for a routine prenatal appointment.         Denies cramping/contractions, vaginal bleeding, discharge or leakage of fluid. Reports +fetal movement.  No HA, vision changes, ruq/epigastric pain.       Patient concerns: Feeling well overall. Nausea has improved- still occurs randomly but not often. Has been exercising- goes to [employer] and yoga.   - Had level 2 ultrasound on - placenta anterior, having a boy! Concern d/t abnormal 3 vessel and 3 vessel tracheal views so fetal echocardiogram was performed. Echo showed right aortic arch. Recommendation for \"non urgent  echo within 24 hours of life.\" Pt states they are coping well but that the \"unknown\" is stressful. They have been counseled on possibility of DiGeorge syndrome. Reports she almost wishes they had not found out all this information but feel they are handling it well.   - Pt has some concern about baby's size at birth d/t  being over 9lbs as a baby.  - Questions about diastasis recti and core strengthening postpartum.  - Would like a breast pump prescription today.  - Does not want to drink the glucose at EOB. Agreeable to apple juice alternative.     Objective:  Vitals:    19 0831   BP: 101/68   Pulse: 76   Weight: 62.1 kg (137 lb)       See OB flowsheet    Assessment/Plan     Encounter Diagnosis   Name Primary?     Encounter for supervision of normal first pregnancy in second trimester - [facility] CNM Yes     Orders Placed This Encounter   Medications     Misc. Devices (BREAST PUMP) MISC     Si each as needed (as needed)     Dispense:  1 each     Refill:  0       - Reviewed total weight gain, encouraged " continued healthy diet and exercise.      - Reviewed why/how to contact provider.      Patient education/orders or handouts today:  fetal movement and Plan for EOB visit w labs    Reviewed level 2 ultrasound and fetal echocardiogram.  Plan EOB next visit.   Pt declines glucose drink. Agreeable to apple juice alternative.     Continue scheduled prenatal care, RTC in 5 weeks for EOB and prn if questions or concerns.      TIGRE Luo, CNM

## 2019-01-24 ENCOUNTER — MYC MEDICAL ADVICE (OUTPATIENT)
Dept: OBGYN | Facility: CLINIC | Age: 29
End: 2019-01-24

## 2019-02-12 PROBLEM — Z00.6 RESEARCH STUDY PATIENT: Status: ACTIVE | Noted: 2019-02-12

## 2019-02-22 ENCOUNTER — OFFICE VISIT (OUTPATIENT)
Dept: OBGYN | Facility: CLINIC | Age: 29
End: 2019-02-22
Attending: ADVANCED PRACTICE MIDWIFE
Payer: COMMERCIAL

## 2019-02-22 VITALS
HEART RATE: 73 BPM | HEIGHT: 64 IN | SYSTOLIC BLOOD PRESSURE: 112 MMHG | BODY MASS INDEX: 24.57 KG/M2 | DIASTOLIC BLOOD PRESSURE: 74 MMHG | WEIGHT: 143.9 LBS

## 2019-02-22 DIAGNOSIS — Z34.93 NORMAL PREGNANCY IN THIRD TRIMESTER: Primary | ICD-10-CM

## 2019-02-22 LAB
DEPRECATED CALCIDIOL+CALCIFEROL SERPL-MC: 47 UG/L (ref 20–75)
ERYTHROCYTE [DISTWIDTH] IN BLOOD BY AUTOMATED COUNT: 13.1 % (ref 10–15)
GLUCOSE 1H P 50 G GLC PO SERPL-MCNC: 92 MG/DL (ref 60–129)
HCT VFR BLD AUTO: 39.8 % (ref 35–47)
HCV AB SERPL QL IA: NONREACTIVE
HGB BLD-MCNC: 12.5 G/DL (ref 11.7–15.7)
MCH RBC QN AUTO: 30.1 PG (ref 26.5–33)
MCHC RBC AUTO-ENTMCNC: 31.4 G/DL (ref 31.5–36.5)
MCV RBC AUTO: 96 FL (ref 78–100)
PLATELET # BLD AUTO: 197 10E9/L (ref 150–450)
RBC # BLD AUTO: 4.15 10E12/L (ref 3.8–5.2)
T PALLIDUM AB SER QL: NONREACTIVE
WBC # BLD AUTO: 8.8 10E9/L (ref 4–11)

## 2019-02-22 PROCEDURE — 82306 VITAMIN D 25 HYDROXY: CPT | Performed by: ADVANCED PRACTICE MIDWIFE

## 2019-02-22 PROCEDURE — 36415 COLL VENOUS BLD VENIPUNCTURE: CPT | Performed by: ADVANCED PRACTICE MIDWIFE

## 2019-02-22 PROCEDURE — 82950 GLUCOSE TEST: CPT | Performed by: ADVANCED PRACTICE MIDWIFE

## 2019-02-22 PROCEDURE — G0463 HOSPITAL OUTPT CLINIC VISIT: HCPCS | Mod: ZF

## 2019-02-22 PROCEDURE — 0064U ANTB TP TOTAL&RPR IA QUAL: CPT | Performed by: ADVANCED PRACTICE MIDWIFE

## 2019-02-22 PROCEDURE — 85027 COMPLETE CBC AUTOMATED: CPT | Performed by: ADVANCED PRACTICE MIDWIFE

## 2019-02-22 PROCEDURE — 86803 HEPATITIS C AB TEST: CPT | Performed by: ADVANCED PRACTICE MIDWIFE

## 2019-02-22 ASSESSMENT — ANXIETY QUESTIONNAIRES
6. BECOMING EASILY ANNOYED OR IRRITABLE: NOT AT ALL
7. FEELING AFRAID AS IF SOMETHING AWFUL MIGHT HAPPEN: NOT AT ALL
5. BEING SO RESTLESS THAT IT IS HARD TO SIT STILL: SEVERAL DAYS
GAD7 TOTAL SCORE: 1
2. NOT BEING ABLE TO STOP OR CONTROL WORRYING: NOT AT ALL
IF YOU CHECKED OFF ANY PROBLEMS ON THIS QUESTIONNAIRE, HOW DIFFICULT HAVE THESE PROBLEMS MADE IT FOR YOU TO DO YOUR WORK, TAKE CARE OF THINGS AT HOME, OR GET ALONG WITH OTHER PEOPLE: NOT DIFFICULT AT ALL
3. WORRYING TOO MUCH ABOUT DIFFERENT THINGS: NOT AT ALL
1. FEELING NERVOUS, ANXIOUS, OR ON EDGE: NOT AT ALL

## 2019-02-22 ASSESSMENT — PATIENT HEALTH QUESTIONNAIRE - PHQ9
5. POOR APPETITE OR OVEREATING: NOT AT ALL
SUM OF ALL RESPONSES TO PHQ QUESTIONS 1-9: 4

## 2019-02-22 ASSESSMENT — MIFFLIN-ST. JEOR: SCORE: 1376.7

## 2019-02-22 NOTE — LETTER
2019       RE: Tatum Cooper  [address] [facility] [address]     Dear Colleague,    Thank you for referring your patient, Tatum Cooper, to the [facility] at [facility]. Please see a copy of my visit note below.     28 year old, , 26w6d,   The patient presents with the following concerns: Has developed some hemorrhoids, using witch hazel and tub soaks.    PHQ-9 SCORE 2018   PHQ-9 Total Score 5     Education completed today includes breast feeding, [facility] hand out , contraception, counting movements, signs of pre-term labor, when to present to birthplace, post partum depression, GBS, getting enough iron and labor induction.  Birth preferences reviewed: Keeping an open mind, hoping to try Nitrous first and hydrotherapy.  May consider water birth.  Labor support:  , planning on  in process of interviewing.     Feeding plans :    Contraception planned:  condoms  The following labs were ordered today:   GCT, CBC w platelets, Vitamin d, Hep C, Anti-treponema  Water birth consent form was given.  Blood type:   ABO   Date Value Ref Range Status   10/29/2018 O  Final     RH(D)   Date Value Ref Range Status   10/29/2018 Pos  Final     Antibody Screen   Date Value Ref Range Status   10/29/2018 Neg  Final   Rhogam  wasgiven.  TDAP  was not given. Patient desires to delay until next visit.  A/P:  Encounter Diagnosis   Name Primary?     Normal pregnancy in third trimester Yes     Orders Placed This Encounter   Procedures     25- OH-Vitamin D     Glucose 1 Hour     Treponema Abs w Reflex to RPR and Titer     CBC with platelets     Hepatitis C antibody       Continue scheduled prenatal care  TIGRE Al CNM

## 2019-02-22 NOTE — PROGRESS NOTES
28 year old, , 26w6d,   The patient presents with the following concerns: Has developed some hemorrhoids, using witch hazel and tub soaks.    PHQ-9 SCORE 2018   PHQ-9 Total Score 5     Education completed today includes breast feeding, [facility] hand out , contraception, counting movements, signs of pre-term labor, when to present to birthplace, post partum depression, GBS, getting enough iron and labor induction.  Birth preferences reviewed: Keeping an open mind, hoping to try Nitrous first and hydrotherapy.  May consider water birth.  Labor support:  , planning on  in process of interviewing.    [place] Feeding plans :    Contraception planned:  condoms  The following labs were ordered today:   GCT, CBC w platelets, Vitamin d, Hep C, Anti-treponema  Water birth consent form was given.  Blood type:   ABO   Date Value Ref Range Status   10/29/2018 O  Final     RH(D)   Date Value Ref Range Status   10/29/2018 Pos  Final     Antibody Screen   Date Value Ref Range Status   10/29/2018 Neg  Final   Rhogam  wasgiven.  TDAP  was not given. Patient desires to delay until next visit.  A/P:  Encounter Diagnosis   Name Primary?     Normal pregnancy in third trimester Yes     Orders Placed This Encounter   Procedures     25- OH-Vitamin D     Glucose 1 Hour     Treponema Abs w Reflex to RPR and Titer     CBC with platelets     Hepatitis C antibody       Continue scheduled prenatal care  TIGRE Al CNM

## 2019-02-23 PROBLEM — Z34.02 ENCOUNTER FOR SUPERVISION OF NORMAL FIRST PREGNANCY IN SECOND TRIMESTER: Status: ACTIVE | Noted: 2018-10-11

## 2019-02-23 ASSESSMENT — ANXIETY QUESTIONNAIRES: GAD7 TOTAL SCORE: 1

## 2019-02-28 ENCOUNTER — MYC MEDICAL ADVICE (OUTPATIENT)
Dept: OBGYN | Facility: CLINIC | Age: 29
End: 2019-02-28

## 2019-02-28 NOTE — LETTER
March 5, 2019        To Whom It May Concern:    Tatum Cooper is being seen in our clinic for prenatal care.  Her due date is May 25, 2019.  She has been medically cleared for travel.  Precautions and indications have been discussed with the patient.      Sincerely,      Dorothy Hull CNM

## 2019-03-28 ENCOUNTER — OFFICE VISIT (OUTPATIENT)
Dept: OBGYN | Facility: CLINIC | Age: 29
End: 2019-03-28
Payer: COMMERCIAL

## 2019-03-28 VITALS
HEART RATE: 81 BPM | BODY MASS INDEX: 25.54 KG/M2 | HEIGHT: 64 IN | DIASTOLIC BLOOD PRESSURE: 69 MMHG | SYSTOLIC BLOOD PRESSURE: 114 MMHG | WEIGHT: 149.6 LBS

## 2019-03-28 DIAGNOSIS — Z34.03 ENCOUNTER FOR SUPERVISION OF NORMAL FIRST PREGNANCY IN THIRD TRIMESTER: Primary | ICD-10-CM

## 2019-03-28 PROCEDURE — G0463 HOSPITAL OUTPT CLINIC VISIT: HCPCS | Mod: ZF

## 2019-03-28 ASSESSMENT — MIFFLIN-ST. JEOR: SCORE: 1397.55

## 2019-03-28 ASSESSMENT — PAIN SCALES - GENERAL: PAINLEVEL: NO PAIN (0)

## 2019-03-28 NOTE — PROGRESS NOTES
"Subjective:      29 year old  at 31w5d presentst for a routine prenatal appointment.     Denies cramping/contractions, vaginal bleeding, discharge or leakage of fluid. Reports +fetal movement.  No HA, vision changes, ruq/epigastric pain.      Patient concerns: Feeling well overall.   Notes the following:   - Tired of being pregnant.   - Trouble sleeping. Rib pain from pressure while side lying.   - Wants to discuss recommendations for \"big baby.\" Concerned since  was big baby and his shoulder got stuck during birth.   - Declines TDAP today, but still considering. Reports reading NIH study indicating baby may have decreased immunity after 2 months of vaccine if she receives TDAP during pregnancy.     Objective:  Vitals:    19 0824   BP: 114/69   Pulse: 81   Weight: 67.9 kg (149 lb 9.6 oz)   Height: 1.632 m (5' 4.25\")     See ob flowsheet    Assessment/Plan     Encounter Diagnosis   Name Primary?     Encounter for supervision of normal first pregnancy in third trimester Yes     - Reviewed total weight gain, encouraged continued healthy diet and exercise.    - Reviewed importance of daily fetal kick count and why/how to contact provider.  - Reviewed why/how to contact provider if headache/visual changes/RUQ or epigastric pain, decreased fetal movement, vaginal bleeding, leakage of fluid or more than 4 contractions in an hour.     Patient education/orders or handouts today:  PTL signs/symptoms    Reviewed normal EOB labs.  Waterbirth consent reviewed, signed and placed for scanning.   HSA paper signed to cover CBE classes that they plan to register for.     - Reviewed that her fundal measurements and weight gain continue to be appropriate. Discussed lack of evidence regarding improved outcomes with IOL for high EFW. Discussed that growth ultrasound may be performed if FH out of range. Discussed that c/s can be offered if EFW was >5000gm. No indication for ultrasound at this time.  - Discussed variety " "of pregnancy sleeping positions including left/right tilt.   - HSA paper signed to cover CBE classes that they plan to register for and .  chosen.  - Reviewed recommendation for Tdap between 27-36 weeks. Discussed CDC guidelines. Pt still considering.  - Waterbirth consent signed and placed for scanning.    Continue scheduled prenatal care, RTC in 2 weeks and prn if questions or concerns.      I, VADIM Song, am serving as a scribe to document services personally performed by CNM based on the provider's statements to me.\" - VADIM Song signature     The encounter was performed by me and scribed by the SNM. The scribed note accurately reflects my personal services and decisions made by me.     TIGRE Luo, ADEEL   "

## 2019-04-14 NOTE — PROGRESS NOTES
"Subjective:      29 year old  at 34w1d presentst for a routine prenatal appointment.    No vaginal bleeding or leakage of fluid.  Occ BH contractions. Normal daily fetal movement.       No HA, visual changes, RUQ or epigastric pain.   Patient concerns:   Feeling well overall.  Reviewed EOB labs with patient.  Reviewed TDAP Declines  Objective:  Vitals:    19 0851   BP: 118/72   Pulse: 69   Weight: 69.5 kg (153 lb 4.8 oz)   Height: 1.632 m (5' 4.25\")   See ob flowsheet  Assessment/Plan     Encounter Diagnosis   Name Primary?     Encounter for supervision of normal first pregnancy in second trimester - [facility] CNM Yes       ABO   Date Value Ref Range Status   10/29/2018 O  Final     RH(D)   Date Value Ref Range Status   10/29/2018 Pos  Final     Antibody Screen   Date Value Ref Range Status   10/29/2018 Neg  Final     - Reviewed total weight gain, encouraged continued healthy diet and exercise.  Reviewed importance of daily fetal kick count and why/how to contact provider.    - Reviewed why/how to contact provider if headache/visual changes/RUQ or epigastric pain, decreased fetal movement, vaginal bleeding, leakage of fluid or more than 4 contractions in an hour.     Reviewed GBS screening at 35-36 wks.    Return to clinic in 2 weeks and prn if questions or concerns.     TIGRE CisseM      "

## 2019-04-17 ENCOUNTER — OFFICE VISIT (OUTPATIENT)
Dept: OBGYN | Facility: CLINIC | Age: 29
End: 2019-04-17
Payer: COMMERCIAL

## 2019-04-17 VITALS
BODY MASS INDEX: 26.17 KG/M2 | DIASTOLIC BLOOD PRESSURE: 72 MMHG | HEIGHT: 64 IN | WEIGHT: 153.3 LBS | SYSTOLIC BLOOD PRESSURE: 118 MMHG | HEART RATE: 69 BPM

## 2019-04-17 DIAGNOSIS — Z34.02 ENCOUNTER FOR SUPERVISION OF NORMAL FIRST PREGNANCY IN SECOND TRIMESTER: Primary | ICD-10-CM

## 2019-04-17 ASSESSMENT — MIFFLIN-ST. JEOR: SCORE: 1414.33

## 2019-04-23 ENCOUNTER — TRANSFERRED RECORDS (OUTPATIENT)
Dept: HEALTH INFORMATION MANAGEMENT | Facility: CLINIC | Age: 29
End: 2019-04-23

## 2019-04-24 ENCOUNTER — TRANSFERRED RECORDS (OUTPATIENT)
Dept: HEALTH INFORMATION MANAGEMENT | Facility: CLINIC | Age: 29
End: 2019-04-24

## 2019-04-25 ENCOUNTER — MYC MEDICAL ADVICE (OUTPATIENT)
Dept: OBGYN | Facility: CLINIC | Age: 29
End: 2019-04-25

## 2019-04-25 DIAGNOSIS — Z34.02 ENCOUNTER FOR SUPERVISION OF NORMAL FIRST PREGNANCY IN SECOND TRIMESTER: ICD-10-CM

## 2019-04-25 DIAGNOSIS — O92.70 LACTATION DISORDER: ICD-10-CM

## 2019-04-26 DIAGNOSIS — O92.70 LACTATION DISORDER: Primary | ICD-10-CM

## 2019-04-26 RX ORDER — BREAST PUMP
1 EACH MISCELLANEOUS PRN
Qty: 1 EACH | Refills: 0 | Status: SHIPPED | OUTPATIENT
Start: 2019-04-26 | End: 2019-04-26

## 2019-04-26 RX ORDER — BREAST PUMP
1 EACH MISCELLANEOUS PRN
Qty: 1 EACH | Refills: 0 | Status: SHIPPED | OUTPATIENT
Start: 2019-04-26 | End: 2019-05-16

## 2019-04-26 RX ORDER — BREAST PUMP
1 EACH MISCELLANEOUS DAILY
Qty: 1 EACH | Refills: 0 | Status: SHIPPED | OUTPATIENT
Start: 2019-04-26 | End: 2019-05-16

## 2019-05-02 ENCOUNTER — TELEPHONE (OUTPATIENT)
Dept: OBGYN | Facility: CLINIC | Age: 29
End: 2019-05-02

## 2019-05-02 ENCOUNTER — OFFICE VISIT (OUTPATIENT)
Dept: OBGYN | Facility: CLINIC | Age: 29
End: 2019-05-02
Payer: COMMERCIAL

## 2019-05-02 VITALS
DIASTOLIC BLOOD PRESSURE: 75 MMHG | HEART RATE: 83 BPM | BODY MASS INDEX: 26.6 KG/M2 | SYSTOLIC BLOOD PRESSURE: 117 MMHG | WEIGHT: 155.8 LBS | HEIGHT: 64 IN

## 2019-05-02 DIAGNOSIS — Z34.03 ENCOUNTER FOR SUPERVISION OF NORMAL FIRST PREGNANCY IN THIRD TRIMESTER: Primary | ICD-10-CM

## 2019-05-02 LAB
ERYTHROCYTE [DISTWIDTH] IN BLOOD BY AUTOMATED COUNT: 13.3 % (ref 10–15)
HCT VFR BLD AUTO: 40.9 % (ref 35–47)
HGB BLD-MCNC: 13 G/DL (ref 11.7–15.7)
MCH RBC QN AUTO: 29.8 PG (ref 26.5–33)
MCHC RBC AUTO-ENTMCNC: 31.8 G/DL (ref 31.5–36.5)
MCV RBC AUTO: 94 FL (ref 78–100)
PLATELET # BLD AUTO: 162 10E9/L (ref 150–450)
RBC # BLD AUTO: 4.36 10E12/L (ref 3.8–5.2)
WBC # BLD AUTO: 9.2 10E9/L (ref 4–11)

## 2019-05-02 PROCEDURE — G0463 HOSPITAL OUTPT CLINIC VISIT: HCPCS | Mod: ZF

## 2019-05-02 PROCEDURE — 87653 STREP B DNA AMP PROBE: CPT | Performed by: ADVANCED PRACTICE MIDWIFE

## 2019-05-02 PROCEDURE — 85027 COMPLETE CBC AUTOMATED: CPT | Performed by: ADVANCED PRACTICE MIDWIFE

## 2019-05-02 PROCEDURE — 36415 COLL VENOUS BLD VENIPUNCTURE: CPT | Performed by: ADVANCED PRACTICE MIDWIFE

## 2019-05-02 ASSESSMENT — MIFFLIN-ST. JEOR: SCORE: 1421.7

## 2019-05-02 NOTE — LETTER
May 2, 2019        Tatum Cooper  [address] [facility] [address]    To Whom it May Concern:    Tatum Cooper is a patient at [facility] receiving prenatal care.  The care team is referring her for physical therapy at [facility]. Physical therapy is recommended for rib and back pain and is considered medically appropriate and necessary.       Sincerely,        TIGRE Luo, ADEEL

## 2019-05-02 NOTE — PROGRESS NOTES
"Subjective:      29 year old  at 36w5d presents for a routine prenatal appointment.    Has felt random cramping- mild, low, mostly when she is sitting. Denies regular contractions, vaginal bleeding, discharge or leakage of fluid. Reports +fetal movement.  No HA, vision changes, ruq/epigastric pain.      Patient concerns: Feeling well overall. Needs referral letter for physical therapy she has been receiving at [facility] for rib and back pain. Questions about  medications at birth- OK with vitamin K, declines erythromycin.     Strongly desires cervical exam today- quoted an NCBI article about effacement at 37 weeks and timing of delivery.    Objective:  Vitals:    19 0900   BP: 117/75   BP Location: Left arm   Patient Position: Chair   Pulse: 83   Weight: 70.7 kg (155 lb 12.8 oz)   Height: 1.626 m (5' 4\")      See OB flowsheet    SVE- closed/long/high, post/med    PHQ9= 4, GAD7= 1    Assessment/Plan     Encounter Diagnosis   Name Primary?     Encounter for supervision of normal first pregnancy in third trimester Yes     Orders Placed This Encounter   Procedures     CBC with Platelets     Labor signs discussed. Reinforced daily fetal movement counts.  Reviewed why/how to contact provider if headache/visual changes/RUQ or epigastric pain, decreased fetal movement, vaginal bleeding, leakage of fluid.    GBS obtained.  CBC with plts ordered. Pt states she may complete at a different laboratory another day.  Referral letter for physical therapy provided.    Continue scheduled prenatal care, RTC in 1 week and prn if questions or concerns.      TIGRE Luo, ADEEL   "

## 2019-05-03 LAB
GP B STREP DNA SPEC QL NAA+PROBE: POSITIVE
SPECIMEN SOURCE: ABNORMAL

## 2019-05-05 ENCOUNTER — MYC MEDICAL ADVICE (OUTPATIENT)
Dept: OBGYN | Facility: CLINIC | Age: 29
End: 2019-05-05

## 2019-05-05 LAB
BACTERIA SPEC CULT: NORMAL
BACTERIA SPEC CULT: NORMAL
SPECIMEN SOURCE: NORMAL

## 2019-05-06 NOTE — TELEPHONE ENCOUNTER
Spoke with Irlanda and advised she talk with provider about result at her visit this week and if test needs to be redone can be done at that time. She indicated understanding and agreed with plan.

## 2019-05-06 NOTE — PROGRESS NOTES
Subjective:      29 year old  at 37w5d presents for a routine prenatal appointment.         No vaginal bleeding,  leakage of fluid, or change in vaginal discharge.  No contractions.  Normal daily fetal movement. No HA, visual changes, RUQ or epigastric pain.   Patient concerns: Desires an SVE. Strongly desires repeat GBS testing, as her PCR was positive but the subculture was negative. Irlanda understands that we will continue to consider her GBS positive even if a repeat test is negative, however, she feels strongly that if the test is not persistently positive she would feel comfortable with the risk for  GBS infection and would decline prophylaxis in labor.      Objective:  Vitals:    19 0834   BP: 112/78   Pulse: 76   Weight: 71.2 kg (157 lb)    See OB flowsheet    SVE %/-2/post/med    Assessment/Plan     Encounter Diagnosis   Name Primary?     Encounter for supervision of normal first pregnancy in second trimester - [facility] CNM Yes       PHQ-9 SCORE 2018   PHQ-9 Total Score 5 4     GBS screening previously positive. After lengthy discussion about the utility of retesting, a repeat GBS swab was collected per patient request.   Labor signs discussed. Reinforced daily fetal movement counts.  Reviewed why/how to contact provider if headache/visual changes/RUQ or epigastric pain, decreased fetal movement, vaginal bleeding, leakage of fluid.  Return to clinic in 1 week and prn if questions or concerns.     TIGRE Cisse CNM

## 2019-05-09 ENCOUNTER — OFFICE VISIT (OUTPATIENT)
Dept: OBGYN | Facility: CLINIC | Age: 29
End: 2019-05-09
Attending: ADVANCED PRACTICE MIDWIFE
Payer: COMMERCIAL

## 2019-05-09 VITALS
WEIGHT: 157 LBS | DIASTOLIC BLOOD PRESSURE: 78 MMHG | BODY MASS INDEX: 26.95 KG/M2 | SYSTOLIC BLOOD PRESSURE: 112 MMHG | HEART RATE: 76 BPM

## 2019-05-09 DIAGNOSIS — Z34.02 ENCOUNTER FOR SUPERVISION OF NORMAL FIRST PREGNANCY IN SECOND TRIMESTER: Primary | ICD-10-CM

## 2019-05-09 PROCEDURE — G0463 HOSPITAL OUTPT CLINIC VISIT: HCPCS | Mod: ZF

## 2019-05-09 PROCEDURE — 87653 STREP B DNA AMP PROBE: CPT | Performed by: ADVANCED PRACTICE MIDWIFE

## 2019-05-09 ASSESSMENT — PAIN SCALES - GENERAL: PAINLEVEL: NO PAIN (0)

## 2019-05-09 NOTE — NURSING NOTE
Chief Complaint   Patient presents with     Prenatal Care     [facility] 37 weeks and 5 days   Carolyne Sandoval LPN

## 2019-05-09 NOTE — LETTER
2019     RE: Tatum Cooper  [address] [facility] [address]     Dear Colleague,    Thank you for referring your patient, Tatum Cooper, to the [facility] at [facility]. Please see a copy of my visit note below.    Subjective:      29 year old  at 37w5d presents for a routine prenatal appointment.         No vaginal bleeding,  leakage of fluid, or change in vaginal discharge.  No contractions.  Normal daily fetal movement. No HA, visual changes, RUQ or epigastric pain.   Patient concerns: Desires an SVE. Strongly desires repeat GBS testing, as her PCR was positive but the subculture was negative. Irlanda understands that we will continue to consider her GBS positive even if a repeat test is negative, however, she feels strongly that if the test is not persistently positive she would feel comfortable with the risk for  GBS infection and would decline prophylaxis in labor.      Objective:  Vitals:    19 0834   BP: 112/78   Pulse: 76   Weight: 71.2 kg (157 lb)    See OB flowsheet    SVE %/-2/post/med    Assessment/Plan     Encounter Diagnosis   Name Primary?     Encounter for supervision of normal first pregnancy in second trimester - [facility] CNM Yes       PHQ-9 SCORE 2018   PHQ-9 Total Score 5 4     GBS screening previously positive. After lengthy discussion about the utility of retesting, a repeat GBS swab was collected per patient request.   Labor signs discussed. Reinforced daily fetal movement counts.  Reviewed why/how to contact provider if headache/visual changes/RUQ or epigastric pain, decreased fetal movement, vaginal bleeding, leakage of fluid.  Return to clinic in 1 week and prn if questions or concerns.     TIGRE CisseM

## 2019-05-10 LAB
GP B STREP DNA SPEC QL NAA+PROBE: POSITIVE
SPECIMEN SOURCE: ABNORMAL

## 2019-05-12 LAB
BACTERIA SPEC CULT: NORMAL
SPECIMEN SOURCE: NORMAL

## 2019-05-15 NOTE — PROGRESS NOTES
"Subjective:     29 year old  at 38w5d presents for routine prenatal visit.            No vaginal bleeding or leakage of fluid.  No contractions.  Normal daily fetal movement.        No HA, visual changes, RUQ or epigastric pain.   Patient concerns:  Feeling well overall. Lost mucous plug yesterday, streaked with blood. Desires SVE and membrane sweep. Notes  was a large baby, had a shoulder dystocia and broken clavicle.     Objective:  Vitals:    19 0845   BP: 125/82   Pulse: 84   Weight: 71.4 kg (157 lb 8 oz)   Height: 1.626 m (5' 4.02\")    See OB flowsheet  SVE /-2,posterior, medium    Assessment/Plan     Encounter Diagnosis   Name Primary?     Encounter for supervision of normal first pregnancy in second trimester - [facility] Yes     -Discussed recommendation to defer membrane sweep until after 39 weeks. Pt agrees. Discussed r/b including increased risk for prelabor ROM and need to start GBS prophylaxis if ROM. Pt verbalizes understanding.   - Reviewed why/how to contact provider if headache/visual changes/RUQ or epigastric pain, decreased fetal movement, vaginal bleeding, leakage of fluid or strong/regular contractions.   Patient education/orders or handouts today: r/b membrane sweep  Return to clinic in 1 week and prn if questions or concerns. Pt plans to RTC Monday for membrane sweep.    TIGRE Cisse CNM        "

## 2019-05-16 ENCOUNTER — OFFICE VISIT (OUTPATIENT)
Dept: OBGYN | Facility: CLINIC | Age: 29
End: 2019-05-16
Attending: ADVANCED PRACTICE MIDWIFE
Payer: COMMERCIAL

## 2019-05-16 VITALS
SYSTOLIC BLOOD PRESSURE: 125 MMHG | HEIGHT: 64 IN | DIASTOLIC BLOOD PRESSURE: 82 MMHG | WEIGHT: 157.5 LBS | BODY MASS INDEX: 26.89 KG/M2 | HEART RATE: 84 BPM

## 2019-05-16 DIAGNOSIS — Z34.02 ENCOUNTER FOR SUPERVISION OF NORMAL FIRST PREGNANCY IN SECOND TRIMESTER: Primary | ICD-10-CM

## 2019-05-16 PROCEDURE — G0463 HOSPITAL OUTPT CLINIC VISIT: HCPCS | Mod: ZF

## 2019-05-16 ASSESSMENT — PAIN SCALES - GENERAL: PAINLEVEL: NO PAIN (0)

## 2019-05-16 ASSESSMENT — MIFFLIN-ST. JEOR: SCORE: 1429.67

## 2019-05-16 NOTE — LETTER
"2019       RE: Tatum Cooper  [address] [facility] [address]     Dear Colleague,    Thank you for referring your patient, Tatum Cooper, to the [facility] at [facility]. Please see a copy of my visit note below.    Subjective:     29 year old  at 38w5d presents for routine prenatal visit.            No vaginal bleeding or leakage of fluid.  No contractions.  Normal daily fetal movement.        No HA, visual changes, RUQ or epigastric pain.   Patient concerns:  Feeling well overall. Lost mucous plug yesterday, streaked with blood. Desires SVE and membrane sweep. Notes  was a large baby, had a shoulder dystocia and broken clavicle.     Objective:  Vitals:    19 0845   BP: 125/82   Pulse: 84   Weight: 71.4 kg (157 lb 8 oz)   Height: 1.626 m (5' 4.02\")    See OB flowsheet  SVE /-2,posterior, medium    Assessment/Plan     Encounter Diagnosis   Name Primary?     Encounter for supervision of normal first pregnancy in second trimester - [facility] CNM Yes     -Discussed recommendation to defer membrane sweep until after 39 weeks. Pt agrees. Discussed r/b including increased risk for prelabor ROM and need to start GBS prophylaxis if ROM. Pt verbalizes understanding.   - Reviewed why/how to contact provider if headache/visual changes/RUQ or epigastric pain, decreased fetal movement, vaginal bleeding, leakage of fluid or strong/regular contractions.   Patient education/orders or handouts today: r/b membrane sweep  Return to clinic in 1 week and prn if questions or concerns. Pt plans to RTC Monday for membrane sweep.    Etelvina Hardwick, TIGRE CNM    "

## 2019-05-20 ENCOUNTER — HOSPITAL ENCOUNTER (INPATIENT)
Facility: CLINIC | Age: 29
LOS: 2 days | Discharge: HOME HEALTH | End: 2019-05-23
Attending: ADVANCED PRACTICE MIDWIFE | Admitting: ADVANCED PRACTICE MIDWIFE
Payer: COMMERCIAL

## 2019-05-20 ENCOUNTER — TELEPHONE (OUTPATIENT)
Dept: OBGYN | Facility: CLINIC | Age: 29
End: 2019-05-20

## 2019-05-20 PROBLEM — Z36.89 ENCOUNTER FOR TRIAGE IN PREGNANT PATIENT: Status: ACTIVE | Noted: 2019-05-20

## 2019-05-21 LAB
ABO + RH BLD: NORMAL
ABO + RH BLD: NORMAL
ALT SERPL W P-5'-P-CCNC: 21 U/L (ref 0–50)
AST SERPL W P-5'-P-CCNC: 28 U/L (ref 0–45)
BASOPHILS # BLD AUTO: 0 10E9/L (ref 0–0.2)
BASOPHILS NFR BLD AUTO: 0.2 %
BLD GP AB SCN SERPL QL: NORMAL
BLOOD BANK CMNT PATIENT-IMP: NORMAL
CREAT SERPL-MCNC: 0.47 MG/DL (ref 0.52–1.04)
CREAT UR-MCNC: 51 MG/DL
DIFFERENTIAL METHOD BLD: NORMAL
EOSINOPHIL # BLD AUTO: 0.1 10E9/L (ref 0–0.7)
EOSINOPHIL NFR BLD AUTO: 0.5 %
ERYTHROCYTE [DISTWIDTH] IN BLOOD BY AUTOMATED COUNT: 12.9 % (ref 10–15)
GFR SERPL CREATININE-BSD FRML MDRD: >90 ML/MIN/{1.73_M2}
HCT VFR BLD AUTO: 41.4 % (ref 35–47)
HGB BLD-MCNC: 13.4 G/DL (ref 11.7–15.7)
IMM GRANULOCYTES # BLD: 0.1 10E9/L (ref 0–0.4)
IMM GRANULOCYTES NFR BLD: 0.8 %
LYMPHOCYTES # BLD AUTO: 1.3 10E9/L (ref 0.8–5.3)
LYMPHOCYTES NFR BLD AUTO: 13.7 %
MCH RBC QN AUTO: 30.4 PG (ref 26.5–33)
MCHC RBC AUTO-ENTMCNC: 32.4 G/DL (ref 31.5–36.5)
MCV RBC AUTO: 94 FL (ref 78–100)
MONOCYTES # BLD AUTO: 0.6 10E9/L (ref 0–1.3)
MONOCYTES NFR BLD AUTO: 5.9 %
NEUTROPHILS # BLD AUTO: 7.6 10E9/L (ref 1.6–8.3)
NEUTROPHILS NFR BLD AUTO: 78.9 %
NRBC # BLD AUTO: 0 10*3/UL
NRBC BLD AUTO-RTO: 0 /100
PLATELET # BLD AUTO: 160 10E9/L (ref 150–450)
PROT UR-MCNC: 0.12 G/L
PROT/CREAT 24H UR: 0.23 G/G CR (ref 0–0.2)
RBC # BLD AUTO: 4.41 10E12/L (ref 3.8–5.2)
SPECIMEN EXP DATE BLD: NORMAL
URATE SERPL-MCNC: 3.6 MG/DL (ref 2.6–6)
WBC # BLD AUTO: 9.6 10E9/L (ref 4–11)

## 2019-05-21 PROCEDURE — 84450 TRANSFERASE (AST) (SGOT): CPT | Performed by: ADVANCED PRACTICE MIDWIFE

## 2019-05-21 PROCEDURE — 12000001 ZZH R&B MED SURG/OB UMMC

## 2019-05-21 PROCEDURE — 0KQM0ZZ REPAIR PERINEUM MUSCLE, OPEN APPROACH: ICD-10-PCS | Performed by: MIDWIFE

## 2019-05-21 PROCEDURE — 82565 ASSAY OF CREATININE: CPT | Performed by: ADVANCED PRACTICE MIDWIFE

## 2019-05-21 PROCEDURE — 86780 TREPONEMA PALLIDUM: CPT | Performed by: ADVANCED PRACTICE MIDWIFE

## 2019-05-21 PROCEDURE — 84460 ALANINE AMINO (ALT) (SGPT): CPT | Performed by: ADVANCED PRACTICE MIDWIFE

## 2019-05-21 PROCEDURE — 72200001 ZZH LABOR CARE VAGINAL DELIVERY SINGLE

## 2019-05-21 PROCEDURE — 85025 COMPLETE CBC W/AUTO DIFF WBC: CPT | Performed by: ADVANCED PRACTICE MIDWIFE

## 2019-05-21 PROCEDURE — 86901 BLOOD TYPING SEROLOGIC RH(D): CPT | Performed by: ADVANCED PRACTICE MIDWIFE

## 2019-05-21 PROCEDURE — 36415 COLL VENOUS BLD VENIPUNCTURE: CPT | Performed by: ADVANCED PRACTICE MIDWIFE

## 2019-05-21 PROCEDURE — 84550 ASSAY OF BLOOD/URIC ACID: CPT | Performed by: ADVANCED PRACTICE MIDWIFE

## 2019-05-21 PROCEDURE — 25000132 ZZH RX MED GY IP 250 OP 250 PS 637: Performed by: MIDWIFE

## 2019-05-21 PROCEDURE — 25000125 ZZHC RX 250: Performed by: ADVANCED PRACTICE MIDWIFE

## 2019-05-21 PROCEDURE — 86850 RBC ANTIBODY SCREEN: CPT | Performed by: ADVANCED PRACTICE MIDWIFE

## 2019-05-21 PROCEDURE — 84156 ASSAY OF PROTEIN URINE: CPT | Performed by: ADVANCED PRACTICE MIDWIFE

## 2019-05-21 PROCEDURE — 86900 BLOOD TYPING SEROLOGIC ABO: CPT | Performed by: ADVANCED PRACTICE MIDWIFE

## 2019-05-21 PROCEDURE — 25000128 H RX IP 250 OP 636: Performed by: ADVANCED PRACTICE MIDWIFE

## 2019-05-21 RX ORDER — IBUPROFEN 800 MG/1
800 TABLET, FILM COATED ORAL
Status: DISCONTINUED | OUTPATIENT
Start: 2019-05-21 | End: 2019-05-21

## 2019-05-21 RX ORDER — OXYTOCIN/0.9 % SODIUM CHLORIDE 30/500 ML
100 PLASTIC BAG, INJECTION (ML) INTRAVENOUS CONTINUOUS
Status: DISCONTINUED | OUTPATIENT
Start: 2019-05-21 | End: 2019-05-23 | Stop reason: HOSPADM

## 2019-05-21 RX ORDER — LANOLIN 100 %
OINTMENT (GRAM) TOPICAL
Status: DISCONTINUED | OUTPATIENT
Start: 2019-05-21 | End: 2019-05-23 | Stop reason: HOSPADM

## 2019-05-21 RX ORDER — OXYTOCIN/0.9 % SODIUM CHLORIDE 30/500 ML
100-340 PLASTIC BAG, INJECTION (ML) INTRAVENOUS CONTINUOUS PRN
Status: DISCONTINUED | OUTPATIENT
Start: 2019-05-21 | End: 2019-05-21

## 2019-05-21 RX ORDER — MISOPROSTOL 200 UG/1
TABLET ORAL
Status: DISCONTINUED
Start: 2019-05-21 | End: 2019-05-21 | Stop reason: HOSPADM

## 2019-05-21 RX ORDER — NALOXONE HYDROCHLORIDE 0.4 MG/ML
.1-.4 INJECTION, SOLUTION INTRAMUSCULAR; INTRAVENOUS; SUBCUTANEOUS
Status: DISCONTINUED | OUTPATIENT
Start: 2019-05-21 | End: 2019-05-21

## 2019-05-21 RX ORDER — OXYTOCIN/0.9 % SODIUM CHLORIDE 30/500 ML
340 PLASTIC BAG, INJECTION (ML) INTRAVENOUS CONTINUOUS PRN
Status: DISCONTINUED | OUTPATIENT
Start: 2019-05-21 | End: 2019-05-23 | Stop reason: HOSPADM

## 2019-05-21 RX ORDER — IBUPROFEN 800 MG/1
800 TABLET, FILM COATED ORAL EVERY 6 HOURS PRN
Status: DISCONTINUED | OUTPATIENT
Start: 2019-05-21 | End: 2019-05-23 | Stop reason: HOSPADM

## 2019-05-21 RX ORDER — BISACODYL 10 MG
10 SUPPOSITORY, RECTAL RECTAL DAILY PRN
Status: DISCONTINUED | OUTPATIENT
Start: 2019-05-23 | End: 2019-05-23 | Stop reason: HOSPADM

## 2019-05-21 RX ORDER — ONDANSETRON 2 MG/ML
4 INJECTION INTRAMUSCULAR; INTRAVENOUS EVERY 6 HOURS PRN
Status: DISCONTINUED | OUTPATIENT
Start: 2019-05-21 | End: 2019-05-21

## 2019-05-21 RX ORDER — AMOXICILLIN 250 MG
2 CAPSULE ORAL 2 TIMES DAILY
Status: DISCONTINUED | OUTPATIENT
Start: 2019-05-21 | End: 2019-05-23 | Stop reason: HOSPADM

## 2019-05-21 RX ORDER — MISOPROSTOL 200 UG/1
400 TABLET ORAL
Status: DISCONTINUED | OUTPATIENT
Start: 2019-05-21 | End: 2019-05-23 | Stop reason: HOSPADM

## 2019-05-21 RX ORDER — ACETAMINOPHEN 325 MG/1
650 TABLET ORAL EVERY 4 HOURS PRN
Status: DISCONTINUED | OUTPATIENT
Start: 2019-05-21 | End: 2019-05-23 | Stop reason: HOSPADM

## 2019-05-21 RX ORDER — SODIUM CHLORIDE, SODIUM LACTATE, POTASSIUM CHLORIDE, CALCIUM CHLORIDE 600; 310; 30; 20 MG/100ML; MG/100ML; MG/100ML; MG/100ML
INJECTION, SOLUTION INTRAVENOUS CONTINUOUS
Status: DISCONTINUED | OUTPATIENT
Start: 2019-05-21 | End: 2019-05-21

## 2019-05-21 RX ORDER — NALOXONE HYDROCHLORIDE 0.4 MG/ML
.1-.4 INJECTION, SOLUTION INTRAMUSCULAR; INTRAVENOUS; SUBCUTANEOUS
Status: DISCONTINUED | OUTPATIENT
Start: 2019-05-21 | End: 2019-05-23 | Stop reason: HOSPADM

## 2019-05-21 RX ORDER — LIDOCAINE HYDROCHLORIDE 10 MG/ML
INJECTION, SOLUTION EPIDURAL; INFILTRATION; INTRACAUDAL; PERINEURAL
Status: DISCONTINUED
Start: 2019-05-21 | End: 2019-05-21 | Stop reason: HOSPADM

## 2019-05-21 RX ORDER — OXYTOCIN/0.9 % SODIUM CHLORIDE 30/500 ML
PLASTIC BAG, INJECTION (ML) INTRAVENOUS
Status: DISCONTINUED
Start: 2019-05-21 | End: 2019-05-21 | Stop reason: HOSPADM

## 2019-05-21 RX ORDER — CARBOPROST TROMETHAMINE 250 UG/ML
250 INJECTION, SOLUTION INTRAMUSCULAR
Status: DISCONTINUED | OUTPATIENT
Start: 2019-05-21 | End: 2019-05-21

## 2019-05-21 RX ORDER — HYDROCORTISONE 2.5 %
CREAM (GRAM) TOPICAL 3 TIMES DAILY PRN
Status: DISCONTINUED | OUTPATIENT
Start: 2019-05-21 | End: 2019-05-23 | Stop reason: HOSPADM

## 2019-05-21 RX ORDER — OXYTOCIN 10 [USP'U]/ML
INJECTION, SOLUTION INTRAMUSCULAR; INTRAVENOUS
Status: DISCONTINUED
Start: 2019-05-21 | End: 2019-05-21 | Stop reason: HOSPADM

## 2019-05-21 RX ORDER — OXYTOCIN 10 [USP'U]/ML
10 INJECTION, SOLUTION INTRAMUSCULAR; INTRAVENOUS
Status: DISCONTINUED | OUTPATIENT
Start: 2019-05-21 | End: 2019-05-23 | Stop reason: HOSPADM

## 2019-05-21 RX ORDER — OXYTOCIN 10 [USP'U]/ML
10 INJECTION, SOLUTION INTRAMUSCULAR; INTRAVENOUS
Status: COMPLETED | OUTPATIENT
Start: 2019-05-21 | End: 2019-05-21

## 2019-05-21 RX ORDER — ACETAMINOPHEN 325 MG/1
650 TABLET ORAL EVERY 4 HOURS PRN
Status: DISCONTINUED | OUTPATIENT
Start: 2019-05-21 | End: 2019-05-21

## 2019-05-21 RX ORDER — PENICILLIN G POTASSIUM 5000000 [IU]/1
5 INJECTION, POWDER, FOR SOLUTION INTRAMUSCULAR; INTRAVENOUS ONCE
Status: DISCONTINUED | OUTPATIENT
Start: 2019-05-21 | End: 2019-05-21

## 2019-05-21 RX ORDER — AMOXICILLIN 250 MG
1 CAPSULE ORAL 2 TIMES DAILY
Status: DISCONTINUED | OUTPATIENT
Start: 2019-05-21 | End: 2019-05-23 | Stop reason: HOSPADM

## 2019-05-21 RX ORDER — LIDOCAINE 40 MG/G
CREAM TOPICAL
Status: DISCONTINUED | OUTPATIENT
Start: 2019-05-21 | End: 2019-05-21

## 2019-05-21 RX ORDER — OXYCODONE AND ACETAMINOPHEN 5; 325 MG/1; MG/1
1 TABLET ORAL
Status: DISCONTINUED | OUTPATIENT
Start: 2019-05-21 | End: 2019-05-21

## 2019-05-21 RX ORDER — METHYLERGONOVINE MALEATE 0.2 MG/ML
200 INJECTION INTRAVENOUS
Status: DISCONTINUED | OUTPATIENT
Start: 2019-05-21 | End: 2019-05-21

## 2019-05-21 RX ADMIN — ACETAMINOPHEN 650 MG: 325 TABLET, FILM COATED ORAL at 20:57

## 2019-05-21 RX ADMIN — OXYTOCIN 10 UNITS: 10 INJECTION, SOLUTION INTRAMUSCULAR; INTRAVENOUS at 19:32

## 2019-05-21 RX ADMIN — LIDOCAINE HYDROCHLORIDE 20 ML: 10 INJECTION, SOLUTION EPIDURAL; INFILTRATION; INTRACAUDAL; PERINEURAL at 19:40

## 2019-05-21 ASSESSMENT — ACTIVITIES OF DAILY LIVING (ADL)
SWALLOWING: 0-->SWALLOWS FOODS/LIQUIDS WITHOUT DIFFICULTY
RETIRED_COMMUNICATION: 0-->UNDERSTANDS/COMMUNICATES WITHOUT DIFFICULTY
AMBULATION: 0-->INDEPENDENT
DRESS: 0-->INDEPENDENT
FALL_HISTORY_WITHIN_LAST_SIX_MONTHS: NO
RETIRED_EATING: 0-->INDEPENDENT
BATHING: 0-->INDEPENDENT
TRANSFERRING: 0-->INDEPENDENT
COGNITION: 0 - NO COGNITION ISSUES REPORTED
TOILETING: 0-->INDEPENDENT

## 2019-05-21 NOTE — PLAN OF CARE
Patient using waterbirth tub in progression of labor, frequent repositioning, voiding in BR. Refused IV to recieve IV abx for GBS pos. Category I FHM strip. SROM at 1500, clear fluid. Afebrile, VSS. Bedside report given to Desiree Bautista.

## 2019-05-21 NOTE — TELEPHONE ENCOUNTER
"Returned page from patient.    Pt reports that she has been in \"prodromal labor\" since the middle of the night last night. She reports that she started having contractions that continued throughout the day today. Reports that the contractions were q3-5 minutes this afternoon and now have been q3-4 for the last 2 hours.  Pt states that feel like they are getting more intense and sharper. She is also experiencing more nausea.    Denies vaginal bleeding, discharge or leakage of fluid. Report +fetal movement.  No HA, vision changes, ruq/epigastric pain.      Pt thinks she may come into the BP to be evaluated for labor.   She is GBS+, initially not interested in PCN prophylaxis but now states that she is agreeable. Does state that she would like to do as few doses as possible.    Charge RN notified of patient's potential arrival.    TIGRE Luo, ADEEL   "

## 2019-05-21 NOTE — PROGRESS NOTES
Labor progress note    S:  Patient more uncomfortable with contractions.  Requesting cervical exam at this time.      O:  Blood pressure 108/58, temperature 98.7  F (37.1  C), temperature source Oral, resp. rate 18, last menstrual period 2018, not currently breastfeeding.  General appearance: uncomfortable with contractions.  Contractions: Every 2-3 minutes. 60-70 seconds duration.  Palpate: strong.  FHT: Baseline 150 with moderate variability. Accelerations are present. Variable decelerations present.  ROM: clear fluid. Membranes have been ruptured for almost 2 hours.  Pelvic exam: 8/ 100%/ Mid/ soft/ +1 asynclitic     Pitocin- none,  Antibiotics- none      A:  IUP @ 39w3d active labor and good progress   Fetal Heart rate tracing Category category one  GBS- positive  Patient Active Problem List   Diagnosis     Irregular menstrual cycle     Encounter for supervision of normal first pregnancy in second trimester - [facility]     Research study patient-Has mouthguard on L and D, please give it to patient when admitted to L and D     Labor and delivery indication for care or intervention     Encounter for triage in pregnant patient         P:  Anticipate   Patient continues to decline antibiotic prophylaxis   Re-positioned to hands and knees and sifting done by [place].  TIGRE Al [facility]

## 2019-05-21 NOTE — PROGRESS NOTES
"Labor progress note    S:  Patient laboring in various positions, has been in and out of the tub.  Reported feeling a \"pop\" feeling a gush of fluid while in the tub.      O:  Blood pressure 108/58, temperature 98.5  F (36.9  C), temperature source Oral, resp. rate 18, last menstrual period 2018, not currently breastfeeding.  General appearance: uncomfortable with contractions.  Contractions: Every 4-7 minutes. 50-60 seconds duration.  Palpate: moderate.  FHT: Baseline 140 with moderate variability. Accelerations are present. Variable decelerations present.  ROM: possible clear fluid since 1510.   Pelvic exam: deferred  Pitocin- none,  Antibiotics- patient continues to decline IV access and antibiotics      A:  IUP @ 39w3d early labor and active labor   Fetal Heart rate tracing Category category one  GBS- positive  Patient Active Problem List   Diagnosis     Irregular menstrual cycle     Encounter for supervision of normal first pregnancy in second trimester - [facility]     Research study patient-Has mouthguard on L and D, please give it to patient when admitted to L and D     Labor and delivery indication for care or intervention     Encounter for triage in pregnant patient         P:  Anticipate   reevaluate in 2-4 hours/PRN  Continue to recommend IV prophylaxis for GBS.       TIGRE Al CNM  "

## 2019-05-21 NOTE — PROGRESS NOTES
Labor progress note    S:  Patient breathing well with contractions, receptive to cervical exam at this time to determine progress.  Having some bloody show.    O:  Blood pressure 119/69, temperature 98.7  F (37.1  C), temperature source Oral, resp. rate 18, last menstrual period 08/09/2018, not currently breastfeeding.  General appearance: uncomfortable with contractions.  Contractions: Every 3-7 minutes. 40-80 seconds duration.  Palpate: strong.  FHT: Baseline 135 with moderate variability. Accelerations are present. Variable decelerations present.  ROM: not ruptured.  Pelvic exam: 6/ 100%/ Mid/ soft/ +1  Pitocin- none,  Antibiotics- Recommended PCN prophylaxis for +GBS status      A:  IUP @ 39w3d active labor   Fetal Heart rate tracing Category category one  GBS- positive  Patient Active Problem List   Diagnosis     Irregular menstrual cycle     Encounter for supervision of normal first pregnancy in second trimester - [facility]     Research study patient-Has mouthguard on L and D, please give it to patient when admitted to L and D     Labor and delivery indication for care or intervention     Encounter for triage in pregnant patient         P:  1. Discussed cervical change since admission and recommend antibiotic prophylaxis for GBS positive status.  Patient resistant to starting antibiotics, does not want multiple doses of antibiotics.  Discussed unknown duration of labor and timing of antibiotics.  Patient requested more time to think about if she wanted to start treatment.  2.  Discussed variable decelerations and the need for continuous monitoring at this time. Discussed that waterbirth is not an option if continuous monitoring is indicated.  Discussed that hydrotherapy is still an option with continuous monitoring.  3.  Re-assess in 2-4 hours and as needed  4.   at bedside     TIGRE Al CNM

## 2019-05-21 NOTE — PROGRESS NOTES
Blood pressure 130/85, temperature 98.1  F (36.7  C), temperature source Oral, resp. rate 20, last menstrual period 2018, not currently breastfeeding.  Patient Vitals for the past 24 hrs:   BP Temp Temp src Resp   19 0100 130/85 98.1  F (36.7  C) Oral 20   19 2345 141/81 97.5  F (36.4  C) Oral --     General appearance:Patient has been laboring well. On telemetry, has been ambulating in room, using birthing ball, leaning over bed. Currently over toilet with emesis. Feels contractions have spaced out but have become stronger and more intense. Calling  who is about to arrive.   CONTRACTIONS: q3-6  Pitocin- none,  Antibiotics- Needs PCN for GBS+ prophylaxis but declines at this time  FETAL HEART TONES: continuous EFM- baseline 135 with moderate variability and positive accelerations. No decelerations.   Possible late decel x 1 but unclear.   ROM: not ruptured  PELVIC EXAM: 580/0, mid/soft  Corral 11    # Pain Assessment:  Current Pain Score 2019   Patient currently in pain? yes   Pain location -   Pain descriptors -   - Tatum is experiencing pain due to contractions. Pain management was discussed and the plan was created in a collaborative fashion.  Tatum's response to the current recommendations: engaged  - Desires unmedicated labor and birth.    ASSESSMENT:  ==============  IUP @ 39w3d admitted in early labor   Fetal Heart Rate - category tone  GBS- positive- declining at this time, agreeable to PCN later in labor     Minimal cervical change   Corral score = 11     Intact BOW    Elevated BP x 1- pre-e labs wnl  Urine pr/cr pending     - Fetal right aortic arch- needs  echo w/in 24 hol    Patient Active Problem List   Diagnosis     Irregular menstrual cycle     Encounter for supervision of normal first pregnancy in second trimester - [facility]     Research study patient-Has mouthguard on L and D, please give it to patient when admitted to L and D     Labor and delivery  indication for care or intervention     Encounter for triage in pregnant patient     PLAN:  ===========  Discussed spacing of contractions and likely need for more regular contractions in order to facilitate cervical change.  Pt to take a shower.   Still desires expectant management; interested in staying admitted to unit at this time.  Declines PCN at this time.  Discussed reassessing/sve in approx 2 hours for cervical change and then continuing to discuss plan of care.  Pt strongly requesting IA. Will review with oncoming providers re: if continuous EFM and toco needed d/t fetal right aortic arch.   Water birth consent was signed in pregnancy.   MD consultant on call Dr. Corbett/ available prn     TIGRE Luo CNM     Addendum @ 1411:  Pt took shower. Again requesting removal of continuous EFM and toco. Agrees at this time to have monitors placed for at least 15-30 minutes.  Birthing sling being set up in room.   and  supportive at bedside.    Urine pr/cr ratio resulted and WNL @ 0.23.    Will give report to ADEEL Beckman @ [id number] and discuss plan of care.    TIGRE Luo CNM

## 2019-05-21 NOTE — PLAN OF CARE
Data: [facility].  Reason for maternal/fetal assessment per patient is Rule Out Labor  .  Patient is a . Prenatal record reviewed.      OB History    Para Term  AB Living   1 0 0 0 0 0   SAB TAB Ectopic Multiple Live Births   0 0 0 0 0      # Outcome Date GA Lbr Eliecer/2nd Weight Sex Delivery Anes PTL Lv   1 Current            . Medical history:   Past Medical History:   Diagnosis Date     Fracture of finger      Fracture of foot    . Gestational Age 39w3d. VSS. Fetal movement present. Patient denies cramping, backache, vaginal discharge, pelvic pressure, UTI symptoms, GI problems, bloody show, vaginal bleeding, edema, headache, visual disturbances, epigastric or URQ pain, abdominal pain, rupture of membranes. Support persons Aaron present.  Action: Verbal consent for EFM. Triage assessment completed.Fetal assessment: Presumed adequate fetal oxygenation documented (see flow record).   Response: Jarod Elise CNM informed of pt arrival. Plan per provider is admit, delay GBS prophylaxis and IV placement per pt request. Patient verbalized agreement with plan. Patient transferred to room 475 ambulatory, oriented to room and call light.

## 2019-05-21 NOTE — H&P
"ADMIT NOTE  =================  39w3d    Tatum Cooper is a 29 year old female with an Patient's last menstrual period was 2018. and Estimated Date of Delivery: May 25, 2019 by ultrasound is [facility] on 2019 at 12:48 AM with in early labor.     HPI  ================  Pt presents to  with reports of contractions increasing in intensity and frequency. Pt reports that she has been in \"prodromal labor\" since the middle of the night last night. She reports that she started having contractions that continued throughout the day today.     States that the contractions were q3-5 minutes this afternoon and now have been q3-4 for the last 2 hours.  Feel like they are getting more intense and sharper. She is also experiencing more nausea.     Since arrival to  feels contractions have spaced out but they are still feeling intense.  SVE on admission 4/80/0, mid/soft.      Denies vaginal bleeding, discharge or leakage of fluid. Report +fetal movement.  No HA, vision changes, ruq/epigastric pain.      Contractions- moderate-strong, q4-6  Fetal movement- active  ROM- no   Vaginal bleeding- none  GBS- positive- pt declines PCN at this time but is agreeable   FOB- is involved, - Aaron  Other labor support- cnm    Weight gain- 157.5 - 124 lbs, Total weight gain- 33 lbs  Height- 64  BMI- 21.4  First prenatal visit at 7+5 weeks, Total visits- 10    OTHER:  - GBS positive- x2 (pt adamantly requested retesting d/t GBS PCR being positive but subculture for sensitivities growing out negative)   - Agreeable to PCN prophylaxis but not on admission. States she wants to wait until she is further along in labor to reduce total  number of doses but still try to get the 2 doses for adequate treatment.   - Fetal right aortic arch- needs  echo within 24 hol   - mouthguard study    PROBLEM LIST  =================  Patient Active Problem List    Diagnosis Date Noted     Labor and delivery " indication for care or intervention 2019     Priority: Medium     Encounter for triage in pregnant patient 2019     Priority: Medium     Research study patient-Has mouthguard on L and D, please give it to patient when admitted to L and D 2019     Priority: Medium     Encounter for supervision of normal first pregnancy in second trimester - [facility] CN[facility] 10/11/2018     Priority: Medium     10/11/2018: OB intake, ALLEGRA based on ultrasound, not c/w LMP  Desires to defer OBI labs until day of NIPT testing around 10 weeks  Varicella added d/t no hx (had vaccine)  Will need GC/CT, Pap due PP (3/2019)  MFM referral placed for NIPT and level 2 us  10/26/18- pt requesting Innatal testing without MFM genetic counseling. Ordered through [facility], Dr Alfred__normal, XY___   normal fetal echo  A post-hansa echocardiogram is recommended to evaluate aortic arch and  branching pattern. Non-urgent echocardiogram should be performed ideally  within first 24 hours of life while PDA is open.           Irregular menstrual cycle 2018     Priority: Medium       HISTORIES  ============  Allergies   Allergen Reactions     Melon Hives     Peggy-Be [Norethindrone (Contraceptive)] Hives     Past Medical History:   Diagnosis Date     Fracture of finger      Fracture of foot      Past Surgical History:   Procedure Laterality Date     CLOSED REDUCTION, PERCUTANEOUS PINNING  HAND, COMBINED  2014    Procedure: COMBINED CLOSED REDUCTION, PERCUTANEOUS PINNING HAND;  Surgeon: Ayala Rabago MD;  Location: [facility]      TOOTH EXTRACTION W/FORCEP      wisdom teeth   .  Family History   Problem Relation Age of Onset     Breast Cancer Maternal Grandmother      Diabetes Maternal Grandmother         type II, lifestyle related     Social History     Tobacco Use     Smoking status: Never Smoker     Smokeless tobacco: Never Used   Substance Use Topics     Alcohol use: Yes     Comment: Occasionally     OB History     Para Term  AB Living   1 0 0 0 0 0   SAB TAB Ectopic Multiple Live Births   0 0 0 0 0      # Outcome Date GA Lbr Eliecer/2nd Weight Sex Delivery Anes PTL Lv   1 Current                 LABS:   ===========  Prenatal Labs:  Rhogam not indicated     Lab Results   Component Value Date    ABO O 10/29/2018    RH Pos 10/29/2018    AS Neg 10/29/2018    HEPBANG Nonreactive 10/29/2018    RUQIGG 82 10/29/2018    HGB 13.0 2019     Rubella immune  Lab Results   Component Value Date    GBS Positive 2019     Other labs:  Results for orders placed or performed during the hospital encounter of 19 (from the past 24 hour(s))   CBC with platelets differential   Result Value Ref Range    WBC 9.6 4.0 - 11.0 10e9/L    RBC Count 4.41 3.8 - 5.2 10e12/L    Hemoglobin 13.4 11.7 - 15.7 g/dL    Hematocrit 41.4 35.0 - 47.0 %    MCV 94 78 - 100 fl    MCH 30.4 26.5 - 33.0 pg    MCHC 32.4 31.5 - 36.5 g/dL    RDW 12.9 10.0 - 15.0 %    Platelet Count 160 150 - 450 10e9/L    Diff Method Automated Method     % Neutrophils 78.9 %    % Lymphocytes 13.7 %    % Monocytes 5.9 %    % Eosinophils 0.5 %    % Basophils 0.2 %    % Immature Granulocytes 0.8 %    Nucleated RBCs 0 0 /100    Absolute Neutrophil 7.6 1.6 - 8.3 10e9/L    Absolute Lymphocytes 1.3 0.8 - 5.3 10e9/L    Absolute Monocytes 0.6 0.0 - 1.3 10e9/L    Absolute Eosinophils 0.1 0.0 - 0.7 10e9/L    Absolute Basophils 0.0 0.0 - 0.2 10e9/L    Abs Immature Granulocytes 0.1 0 - 0.4 10e9/L    Absolute Nucleated RBC 0.0    ABO/Rh type and screen   Result Value Ref Range    ABO PENDING     Antibody Screen PENDING     Test Valid Only At          [facility]    Specimen Expires 2019        ROS  =========  Pt denies significant respiratory, cardiovacular, GI, or muscular/skeletalcomplaints.    See RN data base ROS.     PHYSICAL EXAM:  ===============  /81   Temp 97.5  F (36.4  C) (Oral)   LMP 2018   General  appearance: uncomfortable with contractions; tolerating well  GENERAL APPEARANCE: healthy, alert and no distress  RESP: lungs clear to auscultation - no rales, rhonchi or wheezes  BREAST: normal without masses, tenderness or nipple discharge and no palpable axillary masses or adenopathy  CV: regular rates and rhythm, normal S1 S2, no S3 or S4 and no murmur,and no varicosities  ABDOMEN:  soft, nontender, no epigastric pain  SKIN: no suspicious lesions or rashes  NEURO: Denies headache, blurred vision, other vision changes  PSYCH: mentation appears normal. and affect normal/bright  Legs: Reflexes normal bilaterally     Abdomen: gravid, vertex fetus per Leopold's, non-tender between contractions.   Cephalic presentation confirmed by BSUS  EFW-  7.25 lbs.   CONTRACTIONS: every 4-6 minutes  FETAL HEART TONES: continuous EFM- baseline 130 with moderate variability and positive accelerations. Variable decel x 2 with ctx.  PELVIC EXAM: 4/80/0, mid/soft  OTERO SCORE: 10  BLOODY SHOW: no   ROM:no  FLUID: none  ROMPLUS: not done    # Pain Assessment:  Current Pain Score 2019   Patient currently in pain? yes   Pain location -   Pain descriptors -   - Tatum is experiencing pain due to contractions. Pain management was discussed with Tatum and her spouse and the plan was created in a collaborative fashion.  Tatum's response to the current recommendations: engaged  - desires unmedicated labor and birth    ASSESSMENT:  ==============  IUP @ 39w3d admitted in early labor   Fetal Heart Rate - category two d/t intermittent variable decelerations with ctx  GBS- positive- declining at this time, agreeable to PCN later in labor    SVE 4/80/0, mid/soft  Otero score = 10    Intact BOW    Elevated BP x 1- pre-e labs pending    - Fetal right aortic arch- needs  echo within 24 hol   Patient Active Problem List   Diagnosis     Irregular menstrual cycle     Encounter for supervision of normal first pregnancy in second  trimester - [facility]     Research study patient-Has mouthguard on L and D, please give it to patient when admitted to L and D     Labor and delivery indication for care or intervention     Encounter for triage in pregnant patient       PLAN:  ===========  Admit - see IP orders.  Admission labs ordered- abo/rh type&screen, cbc with plts, antitrep.  Pre-e labs ordered d/t elevated BP upon arrival to BP. Labs pending.   Pt declines IV placement at this time. Agreeable to eventual placement as she is GBS positive.  Recommended IV PCN for GBS prophylaxis. Reviewed criteria for adequate treatment. Pt verbalized understanding but states she wants to wait until she is further along in labor to reduce total  number of doses but still try to get the 2 doses for adequate treatment.   Pain medication options reviewed. Pt is interested in unmedicated labor and birth.  Needs continuous efm and toco d/t possible fetal heart concern.  MD consultant on call Dr. Corbett/ available prn  Ambulation, hydration, position changes, birthing ball and tub options to facilitate labor reviewed with pt .  Anticipate   Reevaluate prn per maternal/fetal indications.    Jarod Matthew, TIGRE, ADEEL

## 2019-05-21 NOTE — PLAN OF CARE
Pt alert, active, and stable. No signs or symptoms of distress. VSS. Denies loss of fluid. See flow sheet for FHR interpretation. Pt not wanting to be on continuous monitoring. CNM aware. CNM will speak with MD to see if this can be accommodated. Pt using stool, birthing ball, peanut ball, sling, and tub for comfort measures.  is here. No questions or concerns at this time. Will continue to monitor pt closely.

## 2019-05-21 NOTE — PLAN OF CARE
Pt progressing in labor. IV attempts x2 unsuccessful and patient tolerated these attempts poorly. Patient refusing ABX for GBS at this point.  Explained to patient subsequent attempts at IV can be made later if she desires.  Not treating this condition would warrant close observation of infant during the post partum period. Continue to monitor closely.

## 2019-05-22 LAB
HGB BLD-MCNC: 11.5 G/DL (ref 11.7–15.7)
T PALLIDUM AB SER QL: NONREACTIVE

## 2019-05-22 PROCEDURE — 12000001 ZZH R&B MED SURG/OB UMMC

## 2019-05-22 PROCEDURE — 25000132 ZZH RX MED GY IP 250 OP 250 PS 637: Performed by: MIDWIFE

## 2019-05-22 PROCEDURE — 85018 HEMOGLOBIN: CPT | Performed by: MIDWIFE

## 2019-05-22 PROCEDURE — 36415 COLL VENOUS BLD VENIPUNCTURE: CPT | Performed by: MIDWIFE

## 2019-05-22 RX ORDER — AMOXICILLIN 250 MG
1 CAPSULE ORAL DAILY PRN
Qty: 20 TABLET | Refills: 0 | Status: SHIPPED | OUTPATIENT
Start: 2019-05-22 | End: 2023-10-23

## 2019-05-22 RX ADMIN — SENNOSIDES AND DOCUSATE SODIUM 2 TABLET: 8.6; 5 TABLET ORAL at 20:14

## 2019-05-22 RX ADMIN — IBUPROFEN 800 MG: 800 TABLET, FILM COATED ORAL at 20:13

## 2019-05-22 RX ADMIN — IBUPROFEN 800 MG: 800 TABLET, FILM COATED ORAL at 08:24

## 2019-05-22 RX ADMIN — SENNOSIDES AND DOCUSATE SODIUM 2 TABLET: 8.6; 5 TABLET ORAL at 08:24

## 2019-05-22 RX ADMIN — IBUPROFEN 800 MG: 800 TABLET, FILM COATED ORAL at 14:35

## 2019-05-22 RX ADMIN — IBUPROFEN 800 MG: 800 TABLET, FILM COATED ORAL at 02:26

## 2019-05-22 NOTE — PLAN OF CARE
Data: Vital signs within normal limits. Postpartum checks within normal limits - see flow record. Patient able to empty bladder independently and is up ambulating with one assist. Breastfeeding on demand with assistance in latching baby.  Action: Patient reports feeling comfortable, declines pain medication at this time.   Response: Positive attachment behaviors observed with infant. Support person, spouse: Aaron, present.   Plan: Continue with the plan of care.

## 2019-05-22 NOTE — PROGRESS NOTES
Post Partum Note  SIGNIFICANT PROBLEMS:  Patient Active Problem List    Diagnosis Date Noted     Encounter for supervision of normal first pregnancy in second trimester - [facility] CN[facility] 10/11/2018     Priority: High     10/11/2018: OB intake, ALLEGRA based on ultrasound, not c/w LMP  Desires to defer OBI labs until day of NIPT testing around 10 weeks  Varicella added d/t no hx (had vaccine)  Will need GC/CT, Pap due PP (3/2019)  MFM referral placed for NIPT and level 2 us  10/26/18- pt requesting Innatal testing without MFM genetic counseling. Ordered through [facility], Dr Alfred__normal, XY___   normal fetal echo  A post- echocardiogram is recommended to evaluate aortic arch and  branching pattern. Non-urgent echocardiogram should be performed ideally  within first 24 hours of life while PDA is open.           Vaginal delivery 2019     Priority: Medium     Labor and delivery indication for care or intervention 2019     Priority: Medium     Encounter for triage in pregnant patient 2019     Priority: Medium     Research study patient-Has mouthguard on L and D, please give it to patient when admitted to L and D 2019     Priority: Medium     Irregular menstrual cycle 2018     Priority: Medium       INTERVAL HISTORY:  /83   Pulse 95   Temp 98  F (36.7  C) (Oral)   Resp 16   LMP 2018   Breastfeeding? Unknown   Pt stable, baby is rooming in. Feels well  Baby has know rightsided aortic arch and had echo today. Still waiting to hear results. Otherwise doing well.  Breast feeding status:initiated and getting lactation help for tight frenulum. May be clipped tomorrow  Complications since 2 hours post delivery: None  # Pain Assessment:  Current Pain Score 2019   Patient currently in pain? denies   Pain location -   Pain descriptors -   - Tautm is experiencing pain due to . Pain management was discussed with Tatum and her spouse and the plan was created in a  collaborative fashion.  Tatum's response to the current recommendations: engaged  - tucks, sitz bath, ibup, tylenol      Patient is tolerating activity well, Voiding without difficulty, cramping is relieved by Ibuprophen, lochia is decreasing and patient denies clots.  Perineal pain is is relieved by Ibuprophen.  The perineum laceration is well approximated    Postpartum hemoglobin   Hemoglobin   Date Value Ref Range Status   05/22/2019 11.5 (L) 11.7 - 15.7 g/dL Final     Blood type   Lab Results   Component Value Date    ABO O 05/21/2019       Lab Results   Component Value Date    RH Pos 05/21/2019     Rubella status   Lab Results   Component Value Date    RUQIGG 82 10/29/2018     Rubella: immune  History of depression: none. Postpartum depression warning signs reviewed.    ASSESSMENT/PLAN:  Normal postpartum exam , Stable Post-partum day #1  Complications:none  Plan d/c home tomorrow. Home Visit Ordered- Yes: breastfeeding  RTC 6 weeks  Postpartum warning s/s reviewed, including bleeding/clots, fever, mastitis, or depression  Emeka/ merlin  Continue prenatal vitamins  Birthcontrol planned:Condoms  Current Discharge Medication List      CONTINUE these medications which have NOT CHANGED    Details   Fish Oil-Cholecalciferol (OMEGA-3 + D PO)       magnesium 100 MG CAPS       Prenatal Vit-Fe Fumarate-FA (PRENATAL VITAMIN PO)       Probiotic Product (PROBIOTIC PO)            TIGRE Jim CNM

## 2019-05-22 NOTE — PLAN OF CARE
Pt labored in tub, in room using sling/birthing ball, nitrous. Continued to refused IV placement and penicillin for GBS+. Was complete and began pushing at 184. Pt pushed using mouthguard continuously.  baby boy at 192. 2nd degree tear repaired. . IM pitocin given due to no IV access. Pt requested tylenol instead of ibuprofen, given at . Pt declined erythromycin - form signed. Placenta sent home with pt per request - form signed. VSS. Fundus midline/firm/UU, will continue to monitor.

## 2019-05-22 NOTE — L&D DELIVERY NOTE
Delivery Summary  Delivery Note    Brief course of labor:    Pt became complete [id number]* and started pushing 1840. Delivered a vigorous baby boy  who was immediately placed on mom's abdomen. Umbilical cord was double clamped and cut  after the cord stopped pulsing.  Placenta spontaneously delivered intact without difficulty via Shultze mechanism.  degree laceration was repaired in the usual fashion with 3-0 vicryl. 1% lidocaine was infiltrated before the repair.  Fundus is firm and midline.  Mom and baby are stable.      IUP at 39 weeks gestation delivered on May 21, 2019.     delivery of a viable Male infant.  Weight : pending  Apgars of 9 at 1 minute and 9 at 5 minutes.  Labor was spontaneous.  Medications administered  in labor:  Pain Rx nitrous ; Antibiotics No; Other pt refused GBS prophylaxis   Perineum: 2nd degree, Periurethral laceration and Vaginal / Sulcus  Placenta-mechanism: spontaneous, intact,  with a 3 vessel cord. IV oxytocin was given.  QBL was 150.  Anticipated Discharge Date:  untreated GBS   Complications of pregnancy, labor and delivery: None  Birth attendants:TIGRE Hyde,ADEEL Cooper MRN# [id number]   Age: 29 year old YOB: 1990     ASSESSMENT & PLAN:        Labor Event Times    Labor onset date:  19 Onset time:   7:30 AM   Dilation complete date:  19 Complete time:   6:40 PM   Start pushing date/time:  2019 1845      Labor Events     labor?:  No   steroids:  None  Labor Type:  Spontaneous  Predominate monitoring during 1st stage:  continuous electronic fetal monitoring     Antibiotics received during labor?:  No     Rupture date/time: 19 1500   Rupture type:  Spontaneous rupture of membranes occuring during spontaneous labor or augmentation  Fluid color:  Clear  Fluid odor:  Normal     1:1 continuous labor support provided by?:  RN Labor partogram used?:  no      Delivery/Placenta Date and Time    Delivery Date:  19  Delivery Time:   7:27 PM   Placenta Date/Time:  2019  7:32 PM  Oxytocin given at the time of delivery:  after delivery of baby     Vaginal Counts     Initial count performed by 2 team members:   Two Team Members   Lily Bermudez CNM       Needles Suture [place] Sponges Instruments   Initial counts 2 0 5    Added to count 0 2 0    Final counts 2 2 5    Placed during labor Accounted for at the end of labor   No    No    No     Final count performed by 2 team members:   Two Team Members   Lily Bermudez CNM      Final count correct?:  Yes     Apgars    Living status:  Living   1 Minute 5 Minute 10 Minute 15 Minute 20 Minute   Skin color: 1  1       Heart rate: 2  2       Reflex irritability: 2  2       Muscle tone: 2  2       Respiratory effort: 2  2       Total: 9  9       Apgars assigned by:  JONAS CHAN     Cord    Vessels:  3 Vessels Complications:  None   Cord Blood Disposition:  Lab Gases Sent?:  No      [place] Resuscitation    Methods:  None   Care at Delivery:  Spontaneous cry. Baby brought to mother's chest. Mouth and nose suctioned for small amount of secretions. Vitals stable.     Skin to Skin and Feeding Plan    Skin to skin initiation date/time:     Skin to skin with:  Mother  Skin to skin end date/time:     How do you plan to feed your baby:  Breastfeeding     Labor Events and Shoulder Dystocia    Fetal Tracing Prior to Delivery:  Category 1  Shoulder dystocia present?:  Neg     Delivery (Maternal) (Provider to Complete) ([id number])    Episiotomy:  None  Perineal lacerations:  2nd    Periurethral laceration:  right    Sulcus laceration:  left    Cervical laceration?:  No       Blood Loss  Mother: Irlanda Cooper #[id number]   Start of Mother's Information    IO Blood Loss  19 0730 - 19    Total QBL Blood Loss (mL) Hospital Encounter 369 mL    Total  369 mL         End of Mother's Information  Mother: Irlanda Cooper #[id number]         Delivery  - Provider to Complete ([id number])    Delivering clinician:  Maricarmen Bermudez APRN CNM  CNM Care:  Exclusive CNM care in labor  Attempted Delivery Types (Choose all that apply):  Spontaneous Vaginal Delivery  Delivery Type (Choose the 1 that will go to the Birth History):  Vaginal, Spontaneous          Placenta    Delayed Cord Clamping:  Done  Date/Time:  5/21/2019  7:32 PM  Removal:  Spontaneous  Disposition:  Patient possesion     Anesthesia    Method:  Nitrous Oxide          Presentation and Position    Presentation:  Vertex  Position:  Left Occiput Anterior           TIGRE Hyde CNM

## 2019-05-22 NOTE — PLAN OF CARE
Patient's postpartum assessment WDL, vital signs stable. Fundus firm and midline, lochia WDL. Using tucks pads and xena bottle for perineum. Voiding without difficulty and is passing gas. Taking ibuprofen PRN for pain control.   Breastfeeds infant well on cue with soma staff assistance to position and latch deeply. Bonding well with infant.   Will continue to monitor and assess.

## 2019-05-22 NOTE — PLAN OF CARE
Pt stable this shift with postpartum checks. Pt is breastfeeding well after baby had several stools and burped up a lot of amniotic fluid.Pt is having good pain relief and only used ibuprofen for pain. Pt using tucks and ice as well. Pt having good output.   Will continue to monitor for changes and assist as needed. Pt very appreciative to help given.

## 2019-05-22 NOTE — LACTATION NOTE
This note was copied from a baby's chart.  Consult for: First time mom breastfeeding well.    History: Vaginal delivery @ 39w3d, AGA infant @ 7# 4 oz. birthweight, diaper output adequate for age, 16 hours old at time of visit.  Maternal history of irregular menstrual cycles and +GBS not treated.     Breast exam of mom: Soft, symmetrical with everted nipples, tiny sore spot on tip of each. Tatum reports early tenderness & bilateral breast growth during pregnancy.      Oral exam of baby:  Higher arch to palate, limited length of tongue tip beyond anterior attachment of lingual frenulum. Unable to elicit suck on finger at time of exam.     Feeding assessment:  Infant latched well with minimal assist to get good, deep latch. Mom reports increase in pain with any slight position change and re-latches to comfort. Even with optimal latch, slight pinching off and on during feeding and minimal crease at very tip of nipple when he came off, rest of nipple rounded. Both nutrititve and non nutritive sucking during feeding.     Education provided: Discussed positioning & using pillows/blankets for support, anatomy of breast and infant mouth for feedings, ways to get and maintain deep latch, breast compressions prn during feedings to enhance milk transfer, point out nutritive suck and how to hear swallows, benefits of and how to do breast massage and hand expression, how to tell when satiated and if getting enough, supply and demand & importance of first few days on establishing supply, what to expect in the coming days and preventing engorgement. Reviewed breastfeeding chapter in [employer] book, breastfeeding log and resource list adding in [email or web address] and additional community resources. They plan follow up at [facility] in [place], mom believes they have LC there but will use [place] or [place] locations on resource list if not.      Plan: Breastfeed on cue with goal of 8 to 12 feedings in 24 hours,  watch for early cues. Hand express after each feeding and spoon feed results until milk is in. Follow up with outpatient lactation consultant within a week of discharge for follow up on latch (borderline tight frenulum) and support with feedings and milk supply prn.

## 2019-05-22 NOTE — PLAN OF CARE
Patient arrived to [facility] unit via wheelchair at 2021 ,with belongings, accompanied by spouse/ significant other, with infant in arms. Received report from Lily Overton L&D RN  and checked bands. Unit and room orientation started. Call light given and within arms reach; no concerns present at this time. Continue with plan of care.

## 2019-05-22 NOTE — PLAN OF CARE
Attempted to have pt ambulate to bathroom with assistance, pt became very lightheaded with standing. Due to this, assisted pt with sitting back on bed and proceeded with using tenisha steady to bring pt into bathroom. Pt felt lightheaded in the bathroom and wanted to take time to feel better before transferring upstairs. Was initially brought into the bathroom at 2135, not transferred upstairs until 2210.  Data: Tatum Cooper transferred to 7143 via wheelchair at 2210. Baby transferred via crib due to pt feeling light headed.  Action: Receiving unit notified of transfer: Yes. Patient and family notified of room change. Report given to ROCIO Melendez at bedside. Belongings sent to receiving unit. Accompanied by Registered Nurse. Oriented patient to surroundings. Call light within reach. ID bands double-checked with receiving RN.  Response: Patient tolerated transfer and is stable.

## 2019-05-23 VITALS
HEART RATE: 95 BPM | TEMPERATURE: 97.3 F | SYSTOLIC BLOOD PRESSURE: 108 MMHG | RESPIRATION RATE: 16 BRPM | DIASTOLIC BLOOD PRESSURE: 73 MMHG

## 2019-05-23 PROCEDURE — 25000128 H RX IP 250 OP 636: Performed by: MIDWIFE

## 2019-05-23 PROCEDURE — 90715 TDAP VACCINE 7 YRS/> IM: CPT | Performed by: MIDWIFE

## 2019-05-23 PROCEDURE — 25000132 ZZH RX MED GY IP 250 OP 250 PS 637: Performed by: MIDWIFE

## 2019-05-23 RX ADMIN — SENNOSIDES AND DOCUSATE SODIUM 1 TABLET: 8.6; 5 TABLET ORAL at 09:23

## 2019-05-23 RX ADMIN — CLOSTRIDIUM TETANI TOXOID ANTIGEN (FORMALDEHYDE INACTIVATED), CORYNEBACTERIUM DIPHTHERIAE TOXOID ANTIGEN (FORMALDEHYDE INACTIVATED), BORDETELLA PERTUSSIS TOXOID ANTIGEN (GLUTARALDEHYDE INACTIVATED), BORDETELLA PERTUSSIS FILAMENTOUS HEMAGGLUTININ ANTIGEN (FORMALDEHYDE INACTIVATED), BORDETELLA PERTUSSIS PERTACTIN ANTIGEN, AND BORDETELLA PERTUSSIS FIMBRIAE 2/3 ANTIGEN 0.5 ML: 5; 2; 2.5; 5; 3; 5 INJECTION, SUSPENSION INTRAMUSCULAR at 13:14

## 2019-05-23 RX ADMIN — IBUPROFEN 800 MG: 800 TABLET, FILM COATED ORAL at 02:53

## 2019-05-23 RX ADMIN — IBUPROFEN 800 MG: 800 TABLET, FILM COATED ORAL at 09:23

## 2019-05-23 NOTE — DISCHARGE SUMMARY
Post Partum Discharge Note    Tatum Cooper MRN# [id number]   Age: 29 year old YOB: 1990     Date of Admission:  2019  Date of Discharge::  2019  Admitting Physician:  TIGRE Hyde CNM  Discharge Physician:  Jarod Matthew CNM, MS      Home clinic: [facility]    SIGNIFICANT PROBLEMS:  Patient Active Problem List   Diagnosis     Irregular menstrual cycle     Encounter for supervision of normal first pregnancy in second trimester - [facility]     Research study patient-Has mouthguard on L and D, please give it to patient when admitted to L and D     Labor and delivery indication for care or intervention     Encounter for triage in pregnant patient     Vaginal delivery      Delivery history:  Delivery Note     Brief course of labor:     Pt became complete [id number]* and started pushing 1840. Delivered a vigorous baby boy  who was immediately placed on mom's abdomen. Umbilical cord was double clamped and cut  after the cord stopped pulsing.  Placenta spontaneously delivered intact without difficulty via Shultze mechanism.  degree laceration was repaired in the usual fashion with 3-0 vicryl. 1% lidocaine was infiltrated before the repair.  Fundus is firm and midline.  Mom and baby are stable.      IUP at 39 weeks gestation delivered on May 21, 2019.     delivery of a viable Male infant.  Weight : pending  Apgars of 9 at 1 minute and 9 at 5 minutes.  Labor was spontaneous.  Medications administered  in labor:  Pain Rx nitrous ; Antibiotics No; Other pt refused GBS prophylaxis   Perineum: 2nd degree, Periurethral laceration and Vaginal / Sulcus  Placenta-mechanism: spontaneous, intact,  with a 3 vessel cord. IV oxytocin was given.  QBL was 150.  Anticipated Discharge Date:  untreated GBS   Complications of pregnancy, labor and delivery: None  Birth attendants:TIGRE Hyde,ADEEL      INTERVAL HISTORY:  /73   Pulse 95   Temp 97.3  F (36.3  C) (Oral)   " Resp 16   LMP 08/09/2018   Breastfeeding? Unknown    Pt stable, baby is rooming in. Baby boy \"Jean (\"Rashad\") Reddy.\" Doing well.  Breast feeding status:initiated and well established. Observed feeding. Pt confident with positioning and latch.   Complications since 2 hours post delivery: None  Patient is tolerating activity well, Voiding without difficulty, cramping is minimal and is relieved by Ibuprofen, lochia is decreasing and patient denies clots.  Perineal pain is is minimal and is relieved by Ibuprofen.  The perineum laceration is well approximated    Postpartum hemoglobin   Recent Labs   Lab 05/22/19  0721 05/21/19  0103   HGB 11.5* 13.4      Blood type   Lab Results   Component Value Date    ABO O 05/21/2019    RH Pos 05/21/2019      Rubella status  Immune    History of depression: denies. Postpartum depression warning signs reviewed.    ASSESSMENT:    Breasts: soft, filling  Nipples: intact without lesion  Fundus: firm @ umbilicus -3, midline, nontender  Abdomen: soft, +bs, nttp  Lochia: small rubra, no clots, no odor  Perineum: laceration well approximated, healing well, no erythema, edema, bruising, hematoma or s/s of infection  Legs: trace edema, nontender      PLAN:  Normal postpartum exam , Stable Post-partum day #2  Complications: none. Baby needs 48 hour stay d/t GBS+ not treated (per pt request), Baby getting fetal echo and imaging  Home Visit Ordered- Yes    Postpartum warning s/s reviewed, including bleeding/clots, fever, mastitis, or depression, Kegels/ crunches.    Continue prenatal vitamins.  Discharge medications ordered- Irma. Pt has other OTC medications at home.     Birthcontrol planned: condoms    Plan d/c home today.   RTC in 2 weeks for wellbeing check and at 6 weeks for routine pp visit.        Review of your medicines      UNREVIEWED medicines. Ask your doctor about these medicines      Dose / Directions   PRENATAL VITAMIN PO      Refills:  0        START taking      Dose / " Directions   senna-docusate 8.6-50 MG tablet  Commonly known as:  SENOKOT-S/PERICOLACE      Dose:  1 tablet  Take 1 tablet by mouth daily as needed for constipation  Quantity:  20 tablet  Refills:  0        CONTINUE these medicines which have NOT CHANGED      Dose / Directions   magnesium 100 MG Caps  Indication:  120mg      Refills:  0     OMEGA-3 + D PO      Refills:  0     PROBIOTIC PO      Refills:  0           Where to get your medicines      These medications were sent to [facility] [place], [place] - [address]  [address] [facility] [address], [facility] [place] [address]    Phone:  [phone]     senna-docusate 8.6-50 MG tablet               Discharge Instructions and Follow-Up:   Discharge diet: Regular   Discharge activity: Pelvic rest: abstain from intercourse and do not use tampons for 6 week(s)   Discharge follow-up: 2 week pp check and 6 week pp visit   Perineal  care: Ice packs, peribottle, sitz baths, witch hazel prn            Discharge Disposition:   Discharged to home      TIGRE Luo, ADEEL

## 2019-05-23 NOTE — PLAN OF CARE
Pt feeling well today; pain well controlled. Bleeding WDL. Breastfeeding independently. Bowel and bladder WDL; LBM today. Eating and drinking without issue. Plan to discharge home this evening.

## 2019-05-23 NOTE — PLAN OF CARE
Pt stable tonight with postpartum checks. Pt breastfeeding her baby and feels that baby needs help with a deeper latch. Baby will have a frenotomy today later.pain is well controlled. Encouraged Pt to soak in tub with rain bath mineral salts. Pt did get a little teary when her baby wanted to cluster feed. Pt stated she was very tired and needed to take a little nap. Offered to watch baby so she could sleep and she felt much better. Pt is also probably having much on her mind with baby's health findings. FOB supportive.

## 2019-05-31 ENCOUNTER — AMBULATORY - HEALTHEAST (OUTPATIENT)
Dept: LAB | Facility: CLINIC | Age: 29
End: 2019-05-31

## 2019-05-31 ENCOUNTER — TELEPHONE (OUTPATIENT)
Dept: OBGYN | Facility: CLINIC | Age: 29
End: 2019-05-31

## 2019-05-31 DIAGNOSIS — R82.90 CLOUDY URINE: ICD-10-CM

## 2019-05-31 DIAGNOSIS — R82.90 CLOUDY URINE: Primary | ICD-10-CM

## 2019-05-31 LAB
ALBUMIN UR-MCNC: NEGATIVE MG/DL
APPEARANCE UR: CLEAR
BACTERIA #/AREA URNS HPF: ABNORMAL HPF
BILIRUB UR QL STRIP: NEGATIVE
COLOR UR AUTO: YELLOW
GLUCOSE UR STRIP-MCNC: NEGATIVE MG/DL
HGB UR QL STRIP: ABNORMAL
KETONES UR STRIP-MCNC: NEGATIVE MG/DL
LEUKOCYTE ESTERASE UR QL STRIP: ABNORMAL
NITRATE UR QL: NEGATIVE
PH UR STRIP: 6 [PH] (ref 5–8)
RBC #/AREA URNS AUTO: ABNORMAL HPF
SP GR UR STRIP: 1.02 (ref 1–1.03)
SQUAMOUS #/AREA URNS AUTO: ABNORMAL LPF
UROBILINOGEN UR STRIP-ACNC: ABNORMAL
WBC #/AREA URNS AUTO: ABNORMAL HPF

## 2019-05-31 NOTE — TELEPHONE ENCOUNTER
Spoke with Irlanda who is about 10 days post partum. Calling today with increased bleeding. States that it has been very light since leaving the hospital and today it is bright red and more like a period. Denies soaking a pad in an hour. Denies any pain, cramping, clots, or fever. Nurse advised that this is normal pp bleeding. If increases or pain, clots, fever, call back. Patient agreeable.     Patient then describes some pain with urination. Denies burning but does have some cloudy urine and sometimes it is uncomfortable to urinate. Asking for urine orders. Nurse input per protocol and patient will leave sample at [facility].

## 2019-05-31 NOTE — TELEPHONE ENCOUNTER
Patient called to request lab orders for UA/UC be faxed to [facility] lab at [phone]. These were sent at 1044.

## 2019-05-31 NOTE — TELEPHONE ENCOUNTER
Patient called this afternoon to inform us that she noted a small clot, size of a marble, x1. Discussed with Irlanda that she should continue to monitor and to call if she notes clots larger than a quarter or golf ball size. Patient in agreement and states understanding.

## 2019-06-02 LAB — BACTERIA SPEC CULT: ABNORMAL

## 2019-06-03 ENCOUNTER — TELEPHONE (OUTPATIENT)
Dept: OBGYN | Facility: CLINIC | Age: 29
End: 2019-06-03

## 2019-06-03 NOTE — TELEPHONE ENCOUNTER
Received call from Tatum who reports she had little to no bleeding the past 2 days and has been in bed all morning so far today and when she got up she passed a golf ball sized clot and is having some bleeding that is 'thick and goopy'. The blood is characterized at light to moderate flow.    Advised that it can be normal to have this occur after being in bed for a while. Encouraged to monitor the bleeding as it should subside and go back to normal.   If it does not, call clinic back. She indicated understanding and agreed with plan.

## 2019-06-04 ENCOUNTER — TELEPHONE (OUTPATIENT)
Dept: PEDIATRIC CARDIOLOGY | Facility: CLINIC | Age: 29
End: 2019-06-04

## 2019-06-04 ENCOUNTER — TELEPHONE (OUTPATIENT)
Dept: OBGYN | Facility: CLINIC | Age: 29
End: 2019-06-04

## 2019-06-04 NOTE — TELEPHONE ENCOUNTER
Spoke to Irlanda who is about 2 wks postpartum and had some symptoms of burning when voiding and Friday 5/31/19 had ua/uc done at [facility]. We don't have the results yet, but she reports she received them via her mychart and ua showed blood(she is still having lochia) and uc was positive for GBS.  She will print these results and send them to [facility] email.    She reports when using the water bottle not feeling burning so it may be her repair and she rather not take antibiotics if not needed.  I will wait for copy and results and then discuss with on call ADEEL

## 2019-06-04 NOTE — TELEPHONE ENCOUNTER
Talked with patient regarding questions about surgical procedures for vascular ring.  She has already talked with cardiac surgeon and cardiology, so I will have Dr Smith Said call her to answer more questions.

## 2019-06-04 NOTE — TELEPHONE ENCOUNTER
Reviewed results in Care Everywhere from ua/uc done Friday 5/31/19 at [facility]. UC positive for GBS but doesn't give an amount.  Discussed with Ayala Dye CNM in clinic and ok with not treating since Irlanda symptoms better since Friday. Attempted to reach Irlanda,let vm that not going to treat but she should increase fluids and call back if symptoms of UTI return . And would prefer to use [facility] lab since we would get a GBS amount. Asked her to call triage back if has any questions.

## 2019-06-04 NOTE — TELEPHONE ENCOUNTER
Received message on triage voicemail from Irlanda asking about results from urine testing.    Review of Epic shows urine testing is ordered but not completed.    Tried to reach Irlanda but received personal voicemail.  Left message to call back.

## 2019-06-10 ENCOUNTER — OFFICE VISIT (OUTPATIENT)
Dept: OBGYN | Facility: CLINIC | Age: 29
End: 2019-06-10
Attending: ADVANCED PRACTICE MIDWIFE
Payer: COMMERCIAL

## 2019-06-10 VITALS — DIASTOLIC BLOOD PRESSURE: 72 MMHG | SYSTOLIC BLOOD PRESSURE: 112 MMHG | HEART RATE: 96 BPM

## 2019-06-10 NOTE — PROGRESS NOTES
S: Tatum Cooper is a 29 year old  here for postpartum check.      Pt is s/p  on 2019.  C/B GBS+ untreated per pt preference. QBL 150ml.  2nd degree laceration, periurethral laceration, vaginal sulcus. Baby boy, Apgars 9, 9.  Birthweight 3135g.     Pt reports she is feeling well. Vaginal bleeding is decreasing. Repair feels that it is healing well but is still occasionally tender.  Normal voiding and BM. Pt desires a referral for pelvic floor PT.   Has questions about: uterine involution timing, diastasis recti and if she can go back to a chiropractor.     Baby boy,  Rashad,  is healthy and gaining weight. Was 8lb 12 on on Friday. Breastfeeding well.Good latch and suck. Feeding improved significantly after frenectomy. Has a good milk supply. Has been stressful dealing with baby's heart concerns- expectantly managing at this time- trying to determine best time for surgery.    Reports mood is stable. Feels well supported by spouse, family and friends. Will be going back to work mid- August. Travelling end of July.      Desires condoms for contraception.      Last pap 3/22/2016- NIL.        O: /72   Pulse 96   LMP 2018    Constitutional:    Alert and oriented, well developed, in no acute distress.       Exam:  Abdomen: Diastasis recti: 1 FB. Abdomen soft, non-tender. BS normal. No masses, organomegaly.   Cardiovascular: RRR. No murmurs, clicks gallops or rub  Respiratory: Lungs clear bilaterally.   Vulva:  Normal genitalia and Bartholin's, Urethra, [place]'s normal  Vagina:  Repair is well approximated, healing well.   1 small suture noted on left perirurethral lac and at introitus   No erythema, edema, bruising, hematoma or s/s of infection  Cervix:  pink, moist, closed, without lesion or CMT.    Uterus: appropriate involution   Adnexa:  Not palpable  Recto-vaginal:   anus normal     PHQ9= 1, GAD7= 2     A: (Z39.2) Routine postpartum follow-up  (primary encounter diagnosis).        P:    Addressed patients questions/concerns.   Reviewed normal timing of postpartum recovery.   - PT referral placed.     - Reviewed contraception options. Desires to use condoms.   Discussed importance of consistent condom use for pregnancy prevention and use of emergency contraception if needed.  Reviewed recommendations for healthy pregnancy spacing.     Needs pap at 6 week postpartum appointment.    RTC for 6 week PP visit.      TIGRE Luo, ENMANUELM

## 2019-06-10 NOTE — LETTER
6/10/2019       RE: Tatum Cooper  [address] [facility] [address] [facility] [address]     Dear Colleague,    Thank you for referring your patient, Tatum Cooper, to the [facility]. Please see a copy of my visit note below.    S: Tatum Cooper is a 29 year old  here for postpartum check.      Pt is s/p  on 2019.  C/B GBS+ untreated per pt preference. QBL 150ml.  2nd degree laceration, periurethral laceration, vaginal sulcus. Baby boy, Apgars 9, 9.  Birthweight 3135g.     Pt reports she is feeling well. Vaginal bleeding is decreasing. Repair feels that it is healing well but is still occasionally tender.  Normal voiding and BM. Pt desires a referral for pelvic floor PT.   Has questions about: uterine involution timing, diastasis recti and if she can go back to a chiropractor.     Baby boy,  Rashad,  is healthy and gaining weight. Was 8lb 12 on on Friday. Breastfeeding well.Good latch and suck. Feeding improved significantly after frenectomy. Has a good milk supply. Has been stressful dealing with baby's heart concerns- expectantly managing at this time- trying to determine best time for surgery.    Reports mood is stable. Feels well supported by spouse, family and friends. Will be going back to work mid- August. Travelling end of July.      Desires condoms for contraception.      Last pap 3/22/2016- NIL.        O: /72   Pulse 96   LMP 2018    Constitutional:    Alert and oriented, well developed, in no acute distress.       Exam:  Abdomen: Diastasis recti:  1 FB. Abdomen soft, non-tender. BS normal. No masses, organomegaly.   Cardiovascular: RRR. No murmurs, clicks gallops or rub  Respiratory: Lungs clear bilaterally.   Vulva:  Normal genitalia and Bartholin's, Urethra, [place]'s normal  Vagina:  Repair is well approximated, healing well.   1 small suture noted on left perirurethral lac and at introitus   No erythema,  edema, bruising, hematoma or s/s of infection  Cervix:  pink, moist, closed, without lesion or CMT.    Uterus: appropriate involution   Adnexa:  Not palpable  Recto-vaginal:   anus normal     PHQ9= 1, GAD7= 2     A: (Z39.2) Routine postpartum follow-up  (primary encounter diagnosis).       P:    Addressed patients questions/concerns.   Reviewed normal timing of postpartum recovery.   - PT referral placed.     - Reviewed contraception options. Desires to use condoms.   Discussed importance of consistent condom use for pregnancy prevention and use of emergency contraception if needed.  Reviewed recommendations for healthy pregnancy spacing.     Needs pap at 6 week postpartum appointment.    RTC for 6 week PP visit.      TIGRE Luo, ADEEL

## 2019-07-01 NOTE — PROGRESS NOTES
Nursing Notes:   Dalia Mercado CMA  2019 10:31 AM  Signed  SUBJECTIVE:   Tatum Cooper is here for her 6-week postpartum checkup.     PHQ-9 score: 3  Hx of Abuse:  No    Delivery Date: 2019.    Delivering provider:  Nori Bermudez CNM.    Type of delivery:  .  Perineum:  tear, with repair.     Delivery complications: None  Infant gender:  boy, weight 7 pounds 4 oz.  Feeding Method:  .  Complications reported with feeding:  none, infant thriving .    Bleeding:  None.  Duration:  5 weeks.  Menses resumed:  No  Bowel/Urinary problems:  Yes     Contraception Planned:  condoms  She  has not had intercourse since delivery..         ================================================================  Irlanda presents alone for her 6 week pp visit. She had an  on 19 for baby kenya Pena. She had a second degree perineal laceration. Rashad had surgery yesterday for his vascular ring and he is recovering well overall. His pre op x-ray showed that incidentally showed evidence of a healed clavicle fracture (Irlanda's  also had a broken clavicle at birth). She is happy that she did not know about it sooner since he was asymptomatic and it healed well.     Birth experience: Positive overall. Felt that labor was long and it was especially challenging to be at 8 cm for a while, but happy that she had a vaginal birth and did not need IOL.  Breastfeeding: Going well. Good supply. Rashad may have to switch to low fat formula for a period of time due to complications r/t his surgery but this is still unclear at this point.   Support: Great support from   Bleeding: Stopped just 3 days ago  Pain: Perineal tenderness L>R  Mood: stable. Emotional about Rashad' surgery, but overall coping very well.   Other symptoms: Feels that vaginal tone is decreased, would like to try pelvic floor PT. Has not lost any additional weight after the first week pp. Eating a nutritious diet. Limited activity pp. Wondering  "if this is normal. No other symptoms concerning for thyroid disorder.   Contraception: Condoms.     ROS: 10 point ROS neg other than the symptoms noted above in the HPI.     EXAM:  /73 (BP Location: Left arm, Patient Position: Chair)   Pulse 75   Ht 1.626 m (5' 4\")   Wt 65.3 kg (144 lb)   LMP 2018   BMI 24.72 kg/m      General: healthy, alert and no distress  Psych: NEGATIVE  Last PHQ-9 score on record= No Value exists for the : HP#PHQ9  Breasts:  Exam deferred per pt preference  Abdomen: Benign, Soft, flat, non-tender, No masses, organomegaly and Diastasis less than 1-2 FB  Incision:  None   Vulva:  Normal genitalia and Bartholin's, Urethra, [place]'s normal.   Vagina:  Atrophic. Tissue at introitus from 3-7 o'clock particularly erythematous and friable with touch, positive q-tip test from 3-7 consistent with lactation atrophy. Repair well healed.   Cervix:  Multiparous, no lesions, pink, moist, closed, without lesion or CMT and Pap collected.    Uterus:  fully involuted and non-tender and retroverted, small, smooth, firm, mobile w/o pain    Adnexa:  Within normal limits and No masses, nodularity, tenderness  Recto-vaginal: anus normal, digital rectal exam deferred    ASSESSMENT:   Encounter Diagnoses   Name Primary?     Screening for cervical cancer      Vaginal atrophy      Routine postpartum follow-up Yes      Normal postpartum exam after   Pregnancy was complicated by:  Fetal anomaly - vascular ring.      PLAN:  Orders Placed This Encounter   Procedures     Pap imaged thin layer screen reflex to HPV if ASCUS - recommend age 25 - 29      Orders Placed This Encounter   Medications     estradiol (ESTRACE) 0.1 MG/GM vaginal cream     Sig: Apply a pea sized amount of cream to the affected area daily for 1 week, then 2-3 times per week for 2 weeks. Wash hands after application.     Dispense:  42.5 g     Refill:  0      Rx short course of topical estrogen for pain r/t atrophy. Discussed r/b " including possible implications for milk supply. Irlanda is unsure if she will start estrogen or wait a few more weeks to see if symptoms spontaneously improve.   Discussed calcium intake, vitamins and supplements including Vitamin D  Exercise encouraged; reviewed normal weight loss expectations. Will consider TSH if no further weight loss after pt resumes exercise, new symptoms or if pt desires sooner.   If lochia resumes with increased activity or persists beyond 8 weeks, pelvic ultrasound  Follow up in 1 year for WWE    TIGRE CisseM

## 2019-07-02 ENCOUNTER — OFFICE VISIT (OUTPATIENT)
Dept: OBGYN | Facility: CLINIC | Age: 29
End: 2019-07-02
Attending: ADVANCED PRACTICE MIDWIFE
Payer: COMMERCIAL

## 2019-07-02 VITALS
BODY MASS INDEX: 24.59 KG/M2 | HEIGHT: 64 IN | WEIGHT: 144 LBS | HEART RATE: 75 BPM | DIASTOLIC BLOOD PRESSURE: 73 MMHG | SYSTOLIC BLOOD PRESSURE: 110 MMHG

## 2019-07-02 DIAGNOSIS — N95.2 VAGINAL ATROPHY: ICD-10-CM

## 2019-07-02 DIAGNOSIS — Z12.4 SCREENING FOR CERVICAL CANCER: ICD-10-CM

## 2019-07-02 PROCEDURE — G0145 SCR C/V CYTO,THINLAYER,RESCR: HCPCS | Performed by: ADVANCED PRACTICE MIDWIFE

## 2019-07-02 PROCEDURE — G0463 HOSPITAL OUTPT CLINIC VISIT: HCPCS | Mod: ZF

## 2019-07-02 RX ORDER — ESTRADIOL 0.1 MG/G
CREAM VAGINAL
Qty: 42.5 G | Refills: 0 | Status: SHIPPED | OUTPATIENT
Start: 2019-07-02 | End: 2022-10-17

## 2019-07-02 ASSESSMENT — ANXIETY QUESTIONNAIRES
5. BEING SO RESTLESS THAT IT IS HARD TO SIT STILL: NOT AT ALL
GAD7 TOTAL SCORE: 1
7. FEELING AFRAID AS IF SOMETHING AWFUL MIGHT HAPPEN: NOT AT ALL
2. NOT BEING ABLE TO STOP OR CONTROL WORRYING: NOT AT ALL
3. WORRYING TOO MUCH ABOUT DIFFERENT THINGS: NOT AT ALL
6. BECOMING EASILY ANNOYED OR IRRITABLE: NOT AT ALL
1. FEELING NERVOUS, ANXIOUS, OR ON EDGE: SEVERAL DAYS

## 2019-07-02 ASSESSMENT — PATIENT HEALTH QUESTIONNAIRE - PHQ9: 5. POOR APPETITE OR OVEREATING: NOT AT ALL

## 2019-07-02 ASSESSMENT — MIFFLIN-ST. JEOR: SCORE: 1368.18

## 2019-07-02 NOTE — LETTER
2019       RE: Tatum Cooper  [address] [facility] [address]     Dear Colleague,    Thank you for referring your patient, Tatum Cooper, to the [facility] at [facility]. Please see a copy of my visit note below.    Nursing Notes:   Dalia MercadoPATRICK  2019 10:31 AM  Signed  SUBJECTIVE:   Tatum Cooper is here for her 6-week postpartum checkup.     PHQ-9 score: 3  Hx of Abuse:  No    Delivery Date: 2019.    Delivering provider:  Nori Bermudez CNM.    Type of delivery:  .  Perineum:  tear, with repair.     Delivery complications: None  Infant gender:  boy, weight 7 pounds 4 oz.  Feeding Method:  .  Complications reported with feeding:  none, infant thriving .    Bleeding:  None.  Duration:  5 weeks.  Menses resumed:  No  Bowel/Urinary problems:  Yes     Contraception Planned:  condoms  She  has not had intercourse since delivery..         ================================================================  Irlanda presents alone for her 6 week pp visit. She had an  on 19 for baby kenya Pena. She had a second degree perineal laceration. Rashad had surgery yesterday for his vascular ring and he is recovering well overall. His pre op x-ray showed that incidentally showed evidence of a healed clavicle fracture (Irlanda's  also had a broken clavicle at birth). She is happy that she did not know about it sooner since he was asymptomatic and it healed well.     Birth experience: Positive overall. Felt that labor was long and it was especially challenging to be at 8 cm for a while, but happy that she had a vaginal birth and did not need IOL.  Breastfeeding: Going well. Good supply. Rashad may have to switch to low fat formula for a period of time due to complications r/t his surgery but this is still unclear at this point.   Support: Great support from   Bleeding: Stopped just 3 days ago  Pain: Perineal  "tenderness L>R  Mood: stable. Emotional about Rashad' surgery, but overall coping very well.   Other symptoms: Feels that vaginal tone is decreased, would like to try pelvic floor PT. Has not lost any additional weight after the first week pp. Eating a nutritious diet. Limited activity pp. Wondering if this is normal. No other symptoms concerning for thyroid disorder.   Contraception: Condoms.     ROS: 10 point ROS neg other than the symptoms noted above in the HPI.     EXAM:  /73 (BP Location: Left arm, Patient Position: Chair)   Pulse 75   Ht 1.626 m (5' 4\")   Wt 65.3 kg (144 lb)   LMP 2018   BMI 24.72 kg/m       General: healthy, alert and no distress  Psych: NEGATIVE  Last PHQ-9 score on record= No Value exists for the : HP#PHQ9  Breasts:  Exam deferred per pt preference  Abdomen: Benign, Soft, flat, non-tender, No masses, organomegaly and Diastasis less than 1-2 FB  Incision:  None   Vulva:  Normal genitalia and Bartholin's, Urethra, Wapella's normal.   Vagina:  Atrophic. Tissue at introitus from 3-7 o'clock particularly erythematous and friable with touch, positive q-tip test from 3-7 consistent with lactation atrophy. Repair well healed.   Cervix:  Multiparous, no lesions, pink, moist, closed, without lesion or CMT and Pap collected.    Uterus:  fully involuted and non-tender and retroverted, small, smooth, firm, mobile w/o pain    Adnexa:  Within normal limits and No masses, nodularity, tenderness  Recto-vaginal: anus normal, digital rectal exam deferred    ASSESSMENT:   Encounter Diagnoses   Name Primary?     Screening for cervical cancer      Vaginal atrophy      Routine postpartum follow-up Yes      Normal postpartum exam after   Pregnancy was complicated by:  Fetal anomaly - vascular ring.      PLAN:  Orders Placed This Encounter   Procedures     Pap imaged thin layer screen reflex to HPV if ASCUS - recommend age 25 - 29      Orders Placed This Encounter   Medications     estradiol " (ESTRACE) 0.1 MG/GM vaginal cream     Sig: Apply a pea sized amount of cream to the affected area daily for 1 week, then 2-3 times per week for 2 weeks. Wash hands after application.     Dispense:  42.5 g     Refill:  0      Rx short course of topical estrogen for pain r/t atrophy. Discussed r/b including possible implications for milk supply. Irlanda is unsure if she will start estrogen or wait a few more weeks to see if symptoms spontaneously improve.   Discussed calcium intake, vitamins and supplements including Vitamin D  Exercise encouraged; reviewed normal weight loss expectations. Will consider TSH if no further weight loss after pt resumes exercise, new symptoms or if pt desires sooner.   If lochia resumes with increased activity or persists beyond 8 weeks, pelvic ultrasound  Follow up in 1 year for WWE    TIGRE Cisse CNM

## 2019-07-02 NOTE — NURSING NOTE
SUBJECTIVE:   Tatum Cooper is here for her 6-week postpartum checkup.     PHQ-9 score: 3  Hx of Abuse:  No    Delivery Date: 2019.    Delivering provider:  Nori Bermudez CNM.    Type of delivery:  .  Perineum:  tear, with repair.     Delivery complications: None  Infant gender:  boy, weight 7 pounds 4 oz.  Feeding Method:  .  Complications reported with feeding:  none, infant thriving .    Bleeding:  None.  Duration:  5 weeks.  Menses resumed:  No  Bowel/Urinary problems:  Yes     Contraception Planned:  condoms  She  has not had intercourse since delivery..

## 2019-07-03 ASSESSMENT — ANXIETY QUESTIONNAIRES: GAD7 TOTAL SCORE: 1

## 2019-07-04 ENCOUNTER — MYC MEDICAL ADVICE (OUTPATIENT)
Dept: OBGYN | Facility: CLINIC | Age: 29
End: 2019-07-04

## 2019-07-04 DIAGNOSIS — N93.9 VAGINAL SPOTTING: ICD-10-CM

## 2019-07-04 DIAGNOSIS — R63.5 ABNORMAL WEIGHT GAIN: Primary | ICD-10-CM

## 2019-07-07 PROBLEM — Z34.02 ENCOUNTER FOR SUPERVISION OF NORMAL FIRST PREGNANCY IN SECOND TRIMESTER: Status: RESOLVED | Noted: 2018-10-11 | Resolved: 2019-07-07

## 2019-07-09 LAB
COPATH REPORT: NORMAL
PAP: NORMAL

## 2019-07-10 ENCOUNTER — TRANSFERRED RECORDS (OUTPATIENT)
Dept: HEALTH INFORMATION MANAGEMENT | Facility: CLINIC | Age: 29
End: 2019-07-10

## 2019-07-10 ENCOUNTER — HOSPITAL ENCOUNTER (OUTPATIENT)
Dept: ULTRASOUND IMAGING | Facility: CLINIC | Age: 29
Discharge: HOME | End: 2019-07-10
Attending: ADVANCED PRACTICE MIDWIFE | Admitting: ADVANCED PRACTICE MIDWIFE
Payer: COMMERCIAL

## 2019-07-10 DIAGNOSIS — R63.5 ABNORMAL WEIGHT GAIN: ICD-10-CM

## 2019-07-10 DIAGNOSIS — N93.9 VAGINAL SPOTTING: ICD-10-CM

## 2019-07-10 LAB — TSH SERPL DL<=0.005 MIU/L-ACNC: 2.82 MU/L (ref 0.4–4)

## 2019-07-10 PROCEDURE — 76830 TRANSVAGINAL US NON-OB: CPT

## 2019-07-10 PROCEDURE — 84443 ASSAY THYROID STIM HORMONE: CPT | Performed by: ADVANCED PRACTICE MIDWIFE

## 2019-07-10 PROCEDURE — 36415 COLL VENOUS BLD VENIPUNCTURE: CPT | Performed by: ADVANCED PRACTICE MIDWIFE

## 2019-08-19 ENCOUNTER — MYC MEDICAL ADVICE (OUTPATIENT)
Dept: OBGYN | Facility: CLINIC | Age: 29
End: 2019-08-19

## 2019-09-04 ENCOUNTER — MYC MEDICAL ADVICE (OUTPATIENT)
Dept: OBGYN | Facility: CLINIC | Age: 29
End: 2019-09-04

## 2019-09-16 NOTE — TELEPHONE ENCOUNTER
Electric breast pump parts ordered as requested and faxed to [facility] [employer] fax#[phone]. Also sent order to Irlanda's email:[email or web address]

## 2019-10-02 NOTE — TELEPHONE ENCOUNTER
Received refill request for estradiol vaginal cream.  Last in clinic 7/2019.  Per Epic, this was prescribed for a short course at July visit.    Tried to reach Irlanda but received personal voicemail.  Left message that refill request was received for estradiol cream but it was intended to be used for only a short time. Please call back if you are needing this and message will then be sent to provider to review.

## 2020-01-24 NOTE — LETTER
5/31/2018     RE: Tatum Cooper  [address] [facility] [address]     Dear Colleague,    Thank you for referring your patient, Tatum Cooper, to the [facility] at [facility]. Please see a copy of my visit note below.    28 year old female with LMP 5/14/18 presents for gynecologic ultrasound indicated by irregular menstrual cycles.  This study was done transvaginally.    Uterine findings:   Presence: Visible Size: Normal 4.5 x 4.5 x 2.8 cm.  Endometrium = 7.0  mm.   Cx length =  31.6 mm.      Flexion:  Anteverted Position: Midline Margins: Smooth Shape: Normal   Contour: Regular Texture: Homogeneous Cavity: Normal Masses: Abnormal- Bulky anterior right uterus.    Pelvic findings:    Right Adnexa: Normal   Left Adnexa: Normal   Bladder:  Normal         Cul - de - sac fluid: None    Ovarian follicles:   Right ovary:  2.3 x 2.1 x 2.4cm.     1 follicles     Size(s):  1.7 x 1.7 x 1.5cm     Left ovary:  2.1 x 1.9 x 1.8cm.     0 follicles      Comments:  Normal appearing uterus and endometrial stripe.  Right ovary with a dominant follicle.    APOLONIA Saeed MD, FACOG    
Parent(s)/Self

## 2020-03-02 ENCOUNTER — HEALTH MAINTENANCE LETTER (OUTPATIENT)
Age: 30
End: 2020-03-02

## 2020-12-20 ENCOUNTER — HEALTH MAINTENANCE LETTER (OUTPATIENT)
Age: 30
End: 2020-12-20

## 2021-01-12 NOTE — PLAN OF CARE
Data: Vital signs within normal limits. Postpartum checks within normal limits - see flow record. Patient eating and drinking normally. Patient able to empty bladder independently and is up ambulating. Patient performing self cares and is able to care for infant.  Action: Patient medicated during the shift for uterine cramping.   Response: Positive attachment behaviors observed with infant. Patient and  supportive of one another and responding appropriately to 's potential heart condition.   Will continue to monitor and provide support.    declines

## 2021-04-24 ENCOUNTER — HEALTH MAINTENANCE LETTER (OUTPATIENT)
Age: 31
End: 2021-04-24

## 2021-05-06 ENCOUNTER — TRANSFERRED RECORDS (OUTPATIENT)
Dept: HEALTH INFORMATION MANAGEMENT | Facility: CLINIC | Age: 31
End: 2021-05-06

## 2021-05-06 ENCOUNTER — RECORDS - HEALTHEAST (OUTPATIENT)
Dept: ADMINISTRATIVE | Facility: OTHER | Age: 31
End: 2021-05-06

## 2021-05-06 ENCOUNTER — MEDICAL CORRESPONDENCE (OUTPATIENT)
Dept: HEALTH INFORMATION MANAGEMENT | Facility: CLINIC | Age: 31
End: 2021-05-06

## 2021-05-21 ENCOUNTER — AMBULATORY - HEALTHEAST (OUTPATIENT)
Dept: MATERNAL FETAL MEDICINE | Facility: HOSPITAL | Age: 31
End: 2021-05-21

## 2021-05-27 ENCOUNTER — OFFICE VISIT - HEALTHEAST (OUTPATIENT)
Dept: MATERNAL FETAL MEDICINE | Facility: HOSPITAL | Age: 31
End: 2021-05-27

## 2021-05-27 ENCOUNTER — RECORDS - HEALTHEAST (OUTPATIENT)
Dept: ADMINISTRATIVE | Facility: OTHER | Age: 31
End: 2021-05-27

## 2021-05-27 ENCOUNTER — RECORDS - HEALTHEAST (OUTPATIENT)
Dept: ULTRASOUND IMAGING | Facility: HOSPITAL | Age: 31
End: 2021-05-27

## 2021-05-27 DIAGNOSIS — O26.90 PREGNANCY RELATED CONDITIONS, UNSPECIFIED, UNSPECIFIED TRIMESTER: ICD-10-CM

## 2021-05-27 DIAGNOSIS — O35.8XX0 FAMILY OR MATERNAL HISTORIC RISK OF CONGENITAL ANOMALY, ANTEPARTUM, SINGLE OR UNSPECIFIED FETUS: ICD-10-CM

## 2021-05-30 VITALS — HEIGHT: 64 IN | WEIGHT: 111.5 LBS | BODY MASS INDEX: 19.04 KG/M2

## 2021-05-30 VITALS — WEIGHT: 112 LBS | HEIGHT: 65 IN | BODY MASS INDEX: 18.66 KG/M2

## 2021-05-31 VITALS — WEIGHT: 110 LBS | BODY MASS INDEX: 18.78 KG/M2 | HEIGHT: 64 IN

## 2021-06-08 NOTE — PROGRESS NOTES
Assessment:     IUD removal well tolerated without complication      Plan:   Warning signs were reviewed with the patient including bleeding, pain and infection.   Discussed birth control options and patient would like to continue with Lutera, which she is currently using.    Refill of Lutera sent to pharmacy  RTO in 3 months of GYN exam     Call with questions/concerns     TT with patient 20 mns >50% time spent in counseling or coordination of care.     Subjective:       Tatum Cooper is a 26 y.o. female who presents for IUD removal. Reason for IUD removal: states she has been bleeding heavily with Paragard. She had a Paragard inserted on 6/2016 at [place]. States she was started on Lutera with the Paragard due to increase in bleeding, to try and decrease menstrual flow.  States she would rather continue with OCP and have Paragard removed.     Review of Systems  Pertinent items are noted in HPI.      Objective:     PROCEDURE:  A speculum was placed and the IUD strings were visualized.  The IUD strings were grasped with a ring forceps and gentle traction was applied.  The IUD was easily removed and was noted to be intact.  The speculum was then removed.

## 2021-06-09 NOTE — PROGRESS NOTES
Assessment:      Healthy female exam.   Contraceptive management     Plan:      1. Discussed nutrition and exercise.  Encouraged continued use of Prenatal vitamin  2. Blood tests: declines all HM /screening labs, including STI testing  3. Pap not due  4. Breast self exam technique reviewed and patient encouraged to perform self-exam monthly.   5. Contraception: OCP refill x 1 year sent to pharmacy  6.  Next pap due 2019.   7.  RTC 1 year for annual physical exam, PRN  8. Recommend increased lubrication for post coital vulvar irritation    Subjective:      Tatum Cooper is a 27 y.o. female who presents for an annual exam. She is overall well.  Happy to have IUD out.   x 2 years.  Planning to stop OCP ~ fall to [facility].    Healthy Habits:   Regular Exercise: Yes   Sunscreen Use: Yes  Healthy Diet: Yes  Dental Visits Regularly: Yes  Seat Belt: Yes  Sexually active: Yes  STI risk No  domestic violence No    Self Breast Exam Monthly:No  Colonoscopy: N/A  Lipid Profile: through her work  Glucose Screen: through her work  Prevention of Osteoporosis: Yes  Last Dexa: N/A      Immunization History   Administered Date(s) Administered     DTaP, historic 1990, 1990, 1990, 10/02/1992, 06/27/1996     Hep A, historic 12/29/2011, 12/20/2012     Hep B, historic 02/24/2000, 04/24/2000, 09/14/2000     HiB, historic 1990, 1990, 1991, 06/09/1992     MMR 06/09/1992, 02/03/2001     Meningococcal MCV4P 04/22/2009     OPV 1990, 1991, 06/02/1992, 06/27/1996     Td, historic 08/19/2003     Tdap 04/22/2009     Varicella 11/07/1995     Immunization status: up to date and documented.    Gynecologic History  Patient's last menstrual period was 03/07/2017 (exact date).  Contraception: OCP (estrogen/progesterone)    Cancer screening:   Last Pap: 3/2016 at [place]. Results were: normal per patient  Last mammogram: N/A.       OB History   No data available       Current Outpatient Prescriptions  "  Medication Sig Dispense Refill     L.ACID/L.CASEI/B.BIF/B.JOHN/FOS (PROBIOTIC BLEND ORAL) Take by mouth.       levonorgestrel-ethinyl estradiol (LUTERA, 28,) 0.1-20 mg-mcg per tablet Take 1 tablet by mouth daily. 84 tablet 0     prenatal #115-iron-folic acid 29 mg iron- 1 mg Chew Chew daily.       No current facility-administered medications for this visit.      No past medical history on file.  No past surgical history on file.  Review of patient's allergies indicates no known allergies.  No family history on file.  Social History     Social History     Marital status:      Spouse name: Aaron     Number of children: 0     Years of education: 18     Occupational History     mangement  at [employer]      Social History Main Topics     Smoking status: Never Smoker     Smokeless tobacco: Never Used     Alcohol use Yes      Comment: rarely     Drug use: No     Sexual activity: Yes     Partners: Male     Birth control/ protection: OCP     Other Topics Concern     Not on file     Social History Narrative       Review of Systems  General:  Denies problem  Eyes: Denies problem  Ears/Nose/Throat: Denies problem  Cardiovascular: Denies problem  Respiratory:  Denies problem  Gastrointestinal:  denies problems  Genitourinary: some external vulvar burning post intercourse  Musculoskeletal:  Denies problem  Skin: Denies problem  Neurologic:denies problems  Psychiatric: denies problems  Endocrine: denies problems        Objective:         Vitals:    03/29/17 1033   BP: 92/60   Pulse: 68   Weight: 111 lb 8 oz (50.6 kg)   Height: 5' 4.2\" (1.631 m)       Physical Exam:  General Appearance: Alert, cooperative, no distress, appears stated age  Skin: Skin color, texture, turgor normal, no rashes or lesions  Throat: Lips, mucosa, and tongue normal; teeth and gums normal  HEENT: grossly normal; otoscopic and opthalmic exam not performed.   Neck: Supple, symmetrical, trachea midline, no adenopathy;  thyroid: not enlarged, " symmetric, no tenderness/mass/nodules  Lungs: Clear to auscultation bilaterally, respirations unlabored  Breasts: No breast masses, tenderness, asymmetry, or nipple discharge.  Heart: Regular rate and rhythm, S1 and S2 normal, no murmur, rub, or gallop  Abdomen: Soft, non-tender. No organomegaly or masses  Pelvic:External genitalia normal without lesions or irritation; no discharge noted.  No cystocele, No rectocele. Uterus & adnexal normal without masses or tenderness.     Medical/Surgical/Social/Family hx reviewed and updated in the chart    TT spent 40 minutes with >50% spent CCC  TIGRE Estrada, ADEEL

## 2021-06-13 NOTE — PROGRESS NOTES
"[facility] Note  Patient Name: Tatum Cooper  Patient Age: 27 y.o.  YOB: 1990  MRN: [id number]  ?  Date of Visit: 9/19/2017  Reason for Office Visit:   Chief Complaint   Patient presents with     Abdominal Pain     Has been on going for a week. usually in the morning after eating. Pain is there all day. No vomitting or nausea. Burping helps. No fever.        HPI: Tatum Cooper 27 y.o. female who is new to the clinic. No significant PMH, she presents for abdominal pain, mostly after eating, tried changing what she eats. Has been improving some. Epigastric area, post meals, 30 min after feel. Aching gnawing pain. No vomiting. Stool is normal, no diarrhea, melena, or hematochezia. She does not have a history of heavy NSAID use or alcohol use. She does not smoke cigarettes. No history of ulcers. No recent travel. She took an home pregnancy test and it was negative.     Review of Systems: As noted in HPI     Current Scheduled Meds:  Outpatient Encounter Prescriptions as of 9/19/2017   Medication Sig Dispense Refill     L.ACID/L.CASEI/B.BIF/B.JOHN/FOS (PROBIOTIC BLEND ORAL) Take by mouth.       prenatal #115-iron-folic acid 29 mg iron- 1 mg Chew Chew daily.       levonorgestrel-ethinyl estradiol (LUTERA, 28,) 0.1-20 mg-mcg per tablet Take 1 tablet by mouth daily. 84 tablet 3     omeprazole (PRILOSEC) 20 MG capsule Take 1 capsule (20 mg total) by mouth daily before breakfast. 30 capsule 0     No facility-administered encounter medications on file as of 9/19/2017.        Objective / Physical Examination:  /72  Pulse 80  Ht 5' 4.2\" (1.631 m)  Wt 110 lb (49.9 kg)  BMI 18.76 kg/m2  Wt Readings from Last 3 Encounters:   09/19/17 110 lb (49.9 kg)   03/29/17 111 lb 8 oz (50.6 kg)   01/13/17 112 lb (50.8 kg)     Body mass index is 18.76 kg/(m^2). (>25?)    General Appearance: Alert and oriented in no acute distress  Cardiovascular: RRR   Abdomen: Bowel sounds active all four quadrants. " Soft, non-tender. No r/g. No hepatomegaly or splenomegaly.  Integumentary: Warm and dry  Neuro: Alert and oriented, follows commands appropriately.    Assessment / Plan / Medical Decision Making:      Encounter Diagnoses   Name Primary?     Epigastric abdominal pain Yes        1. Epigastric abdominal pain    Gastritis v GERD v PUD v DUD  Abdominal exam benign. Hemodynamically stable.   She is not high risk for ulcer but cannot rule it out  Will start on prilosec 20 mg daily. If not improving in a week, return for labs, including cbc, lipase and stool test for h pylori.   Watch for red flags, worsening pain, hematemesis, melena. If so, should be seen immediately. She agrees with plan    - omeprazole (PRILOSEC) 20 MG capsule; Take 1 capsule (20 mg total) by mouth daily before breakfast.  Dispense: 30 capsule; Refill: 0  - HM2(CBC w/o Differential); Future  - Lipase; Future  - H. pylori Antigen, Stool; Future    Total time spent with patient was 20 minutes with >50% of time spent in face-to-face counseling regarding the above plan     Channing Peres MD  [facility]

## 2021-06-21 NOTE — PROGRESS NOTES
NETTIEM received referral from [facility] for FTS, Pt was scheduled on 10/29 for GC. Pt called on 10/26 wanting to cancel apt due to cost. Referring clinic notified. Removing order.        Mavis

## 2021-06-22 NOTE — PROGRESS NOTES
"Please see \"Imaging\" tab under Chart Review for full report.  This ultrasound was performed in the [facility], and may be located under Care Everywhere.    Dolores Rondon MD  Maternal Fetal Medicine    "

## 2021-06-25 NOTE — PROGRESS NOTES
"Please see the \"Imaging\" tab for details of today's ultrasound.    Tatum Bingham MD  Specialist in Maternal-Fetal Medicine    "

## 2021-07-06 ENCOUNTER — DOCUMENTATION ONLY (OUTPATIENT)
Dept: ADMINISTRATIVE | Facility: OTHER | Age: 31
End: 2021-07-06

## 2021-07-06 NOTE — PROGRESS NOTES
This encounter was created as part of manual pregnancy episode data conversion for the single EHR project. The following information (where applicable) was manually abstracted from the [employer] Epic instance on July 6, 2021: pregnancy episode name/date, dating, episode encounter linking, pregravid weight, number of fetuses, and pregnancy overview and plan.     Maribel Mcintosh RN   Clinical Informatics

## 2021-10-03 ENCOUNTER — HEALTH MAINTENANCE LETTER (OUTPATIENT)
Age: 31
End: 2021-10-03

## 2022-05-15 ENCOUNTER — HEALTH MAINTENANCE LETTER (OUTPATIENT)
Age: 32
End: 2022-05-15

## 2022-09-10 ENCOUNTER — HEALTH MAINTENANCE LETTER (OUTPATIENT)
Age: 32
End: 2022-09-10

## 2022-09-22 ENCOUNTER — HOSPITAL ENCOUNTER (EMERGENCY)
Facility: HOSPITAL | Age: 32
Discharge: HOME | End: 2022-09-23
Attending: EMERGENCY MEDICINE | Admitting: EMERGENCY MEDICINE
Payer: COMMERCIAL

## 2022-09-22 ENCOUNTER — APPOINTMENT (OUTPATIENT)
Dept: ULTRASOUND IMAGING | Facility: HOSPITAL | Age: 32
End: 2022-09-22
Attending: FAMILY MEDICINE
Payer: COMMERCIAL

## 2022-09-22 VITALS
OXYGEN SATURATION: 100 % | BODY MASS INDEX: 22.31 KG/M2 | RESPIRATION RATE: 16 BRPM | WEIGHT: 130 LBS | SYSTOLIC BLOOD PRESSURE: 131 MMHG | DIASTOLIC BLOOD PRESSURE: 86 MMHG | HEART RATE: 83 BPM | TEMPERATURE: 97.9 F

## 2022-09-22 DIAGNOSIS — R10.13 EPIGASTRIC PAIN: ICD-10-CM

## 2022-09-22 LAB
ALBUMIN SERPL BCG-MCNC: 4.7 G/DL (ref 3.5–5.2)
ALBUMIN UR-MCNC: NEGATIVE MG/DL
ALP SERPL-CCNC: 91 U/L (ref 35–104)
ALT SERPL W P-5'-P-CCNC: 18 U/L (ref 10–35)
ANION GAP SERPL CALCULATED.3IONS-SCNC: 12 MMOL/L (ref 7–15)
APPEARANCE UR: CLEAR
AST SERPL W P-5'-P-CCNC: 23 U/L (ref 10–35)
BACTERIA #/AREA URNS HPF: ABNORMAL /HPF
BASOPHILS # BLD AUTO: 0 10E3/UL (ref 0–0.2)
BASOPHILS NFR BLD AUTO: 0 %
BILIRUB DIRECT SERPL-MCNC: <0.2 MG/DL (ref 0–0.3)
BILIRUB SERPL-MCNC: 0.3 MG/DL
BILIRUB UR QL STRIP: NEGATIVE
BUN SERPL-MCNC: 18 MG/DL (ref 6–20)
CALCIUM SERPL-MCNC: 9.4 MG/DL (ref 8.6–10)
CHLORIDE SERPL-SCNC: 104 MMOL/L (ref 98–107)
COLOR UR AUTO: COLORLESS
CREAT SERPL-MCNC: 0.82 MG/DL (ref 0.51–0.95)
DEPRECATED HCO3 PLAS-SCNC: 28 MMOL/L (ref 22–29)
EOSINOPHIL # BLD AUTO: 0.2 10E3/UL (ref 0–0.7)
EOSINOPHIL NFR BLD AUTO: 2 %
ERYTHROCYTE [DISTWIDTH] IN BLOOD BY AUTOMATED COUNT: 12.6 % (ref 10–15)
GFR SERPL CREATININE-BSD FRML MDRD: >90 ML/MIN/1.73M2
GLUCOSE SERPL-MCNC: 110 MG/DL (ref 70–99)
GLUCOSE UR STRIP-MCNC: NEGATIVE MG/DL
HCG SERPL QL: NEGATIVE
HCT VFR BLD AUTO: 43 % (ref 35–47)
HGB BLD-MCNC: 13.6 G/DL (ref 11.7–15.7)
HGB UR QL STRIP: NEGATIVE
HOLD SPECIMEN: NORMAL
IMM GRANULOCYTES # BLD: 0 10E3/UL
IMM GRANULOCYTES NFR BLD: 0 %
KETONES UR STRIP-MCNC: NEGATIVE MG/DL
LEUKOCYTE ESTERASE UR QL STRIP: ABNORMAL
LIPASE SERPL-CCNC: 32 U/L (ref 13–60)
LYMPHOCYTES # BLD AUTO: 2.6 10E3/UL (ref 0.8–5.3)
LYMPHOCYTES NFR BLD AUTO: 38 %
MCH RBC QN AUTO: 27.5 PG (ref 26.5–33)
MCHC RBC AUTO-ENTMCNC: 31.6 G/DL (ref 31.5–36.5)
MCV RBC AUTO: 87 FL (ref 78–100)
MONOCYTES # BLD AUTO: 0.4 10E3/UL (ref 0–1.3)
MONOCYTES NFR BLD AUTO: 5 %
NEUTROPHILS # BLD AUTO: 3.8 10E3/UL (ref 1.6–8.3)
NEUTROPHILS NFR BLD AUTO: 55 %
NITRATE UR QL: NEGATIVE
NRBC # BLD AUTO: 0 10E3/UL
NRBC BLD AUTO-RTO: 0 /100
PH UR STRIP: 7 [PH] (ref 5–7)
PLATELET # BLD AUTO: 295 10E3/UL (ref 150–450)
POTASSIUM SERPL-SCNC: 3.7 MMOL/L (ref 3.4–5.3)
PROT SERPL-MCNC: 7.5 G/DL (ref 6.4–8.3)
RBC # BLD AUTO: 4.95 10E6/UL (ref 3.8–5.2)
RBC URINE: 1 /HPF
SODIUM SERPL-SCNC: 144 MMOL/L (ref 136–145)
SP GR UR STRIP: 1 (ref 1–1.03)
SQUAMOUS EPITHELIAL: 4 /HPF
TRANSITIONAL EPI: <1 /HPF
UROBILINOGEN UR STRIP-MCNC: <2 MG/DL
WBC # BLD AUTO: 7 10E3/UL (ref 4–11)
WBC URINE: 12 /HPF

## 2022-09-22 PROCEDURE — 84703 CHORIONIC GONADOTROPIN ASSAY: CPT | Performed by: EMERGENCY MEDICINE

## 2022-09-22 PROCEDURE — 93005 ELECTROCARDIOGRAM TRACING: CPT | Performed by: STUDENT IN AN ORGANIZED HEALTH CARE EDUCATION/TRAINING PROGRAM

## 2022-09-22 PROCEDURE — 36415 COLL VENOUS BLD VENIPUNCTURE: CPT | Performed by: STUDENT IN AN ORGANIZED HEALTH CARE EDUCATION/TRAINING PROGRAM

## 2022-09-22 PROCEDURE — 93005 ELECTROCARDIOGRAM TRACING: CPT | Performed by: EMERGENCY MEDICINE

## 2022-09-22 PROCEDURE — 76705 ECHO EXAM OF ABDOMEN: CPT

## 2022-09-22 PROCEDURE — 83690 ASSAY OF LIPASE: CPT | Performed by: FAMILY MEDICINE

## 2022-09-22 PROCEDURE — 81003 URINALYSIS AUTO W/O SCOPE: CPT | Performed by: STUDENT IN AN ORGANIZED HEALTH CARE EDUCATION/TRAINING PROGRAM

## 2022-09-22 PROCEDURE — 85025 COMPLETE CBC W/AUTO DIFF WBC: CPT | Performed by: STUDENT IN AN ORGANIZED HEALTH CARE EDUCATION/TRAINING PROGRAM

## 2022-09-22 PROCEDURE — 81001 URINALYSIS AUTO W/SCOPE: CPT | Performed by: EMERGENCY MEDICINE

## 2022-09-22 PROCEDURE — 87088 URINE BACTERIA CULTURE: CPT | Performed by: EMERGENCY MEDICINE

## 2022-09-22 PROCEDURE — 80053 COMPREHEN METABOLIC PANEL: CPT | Performed by: EMERGENCY MEDICINE

## 2022-09-22 PROCEDURE — 80053 COMPREHEN METABOLIC PANEL: CPT | Performed by: STUDENT IN AN ORGANIZED HEALTH CARE EDUCATION/TRAINING PROGRAM

## 2022-09-22 PROCEDURE — 85025 COMPLETE CBC W/AUTO DIFF WBC: CPT | Performed by: EMERGENCY MEDICINE

## 2022-09-22 PROCEDURE — 99285 EMERGENCY DEPT VISIT HI MDM: CPT | Mod: 25

## 2022-09-22 PROCEDURE — 84703 CHORIONIC GONADOTROPIN ASSAY: CPT | Performed by: STUDENT IN AN ORGANIZED HEALTH CARE EDUCATION/TRAINING PROGRAM

## 2022-09-22 PROCEDURE — 82248 BILIRUBIN DIRECT: CPT | Performed by: FAMILY MEDICINE

## 2022-09-22 ASSESSMENT — ENCOUNTER SYMPTOMS
ABDOMINAL DISTENTION: 1
SHORTNESS OF BREATH: 0
NAUSEA: 1
ABDOMINAL PAIN: 1
VOMITING: 0

## 2022-09-23 NOTE — ED TRIAGE NOTES
Pt arrives with pain in upper abdominal area that radiates up to right chest and shoulder. Was cooking dinner tonight when it started. Burping makes pain feel better. No shortness of breath or back pain. Does endorses intermittent nausea.

## 2022-09-23 NOTE — DISCHARGE INSTRUCTIONS
You may try over-the-counter Prilosec, you may also try Tums or Maalox if this pain continues.  Return to the emergency department for any new or worsening symptoms.

## 2022-09-23 NOTE — ED PROVIDER NOTES
EMERGENCY DEPARTMENT ENCOUNTER      NAME: Tatum Cooper  AGE: 32 year old female  YOB: 1990  MRN: [id number]  EVALUATION DATE & TIME: No admission date for patient encounter.    PCP: Jo Calloway    ED PROVIDER: Neetu Sprague M.D.      Chief Complaint   Patient presents with     Chest Pain     Abdominal Pain         FINAL IMPRESSION:  1. Epigastric pain        MEDICAL DECISION MAKING:    Pertinent Labs & Imaging studies reviewed. (See chart for details)  ED Course as of 09/23/22 0022   Thu Sep 22, 2022   2321 Afebrile.  Vital signs are unremarkable.  Patient is presenting with epigastric abdominal pain and pain that radiates up to the right side of her chest and her posterior right shoulder.  Cooking dinner tonight when it started.  Says that burping made the pain feel better.  She felt bloated.  Pain got better after eating.  No shortness of breath or chest pain.  No back pain.  Intermittent nausea but no vomiting.  No acidic brash.  1 year postpartum.  Was initially concerned for possible cardiac issue secondary to postpartum but no pain in her chest, no sharp, stabbing pain.  No ripping pain.  Pain at this time is improved and barely present.    Exam for patient here is completely unremarkable with the exception of very mild epigastric tenderness to palpation.    Had labs done while patient was in triage that did not show any acute findings.  Ultrasound was negative.  This does sound more like GERD versus gallbladder pain.  Does not seem cardiac in nature.  Does not seem anything more worrisome such as PE, cardiac etiology.  Instructed to take Prilosec, Tums or other medications to see if this does help.  If pain worsens or continues or she develops any shortness of breath or chest pain she should return to the emergency department.  Discussed all of this with patient.  She is in agreement with plan.           Critical care: 0 minutes excluding separately billable procedures.  Includes  bedside management, time reviewing test results, review of records, discussing the case with staff, documenting the medical record and time spent with family members (or surrogate decision makers) discussing specific treatment issues.          ED COURSE:  11:18 PM I met with the patient to gather history and to perform my initial exam. I discussed the plan for care while in the Emergency Department. This patient's care was rendered during the COVID-19 pandemic and care commenced initially through triage due to overwhelmed hospital systems with extremely long wait times and boarding patients pending admission with limited availability of emergency department rooms and nursing staff for evaluation and work-up.  11:23 PM I discussed the plan for discharge with the patient, and patient is agreeable. We discussed supportive cares at home and reasons for return to the ER including new or worsening symptoms - all questions and concerns addressed. Patient to be discharged by RN.         The importance of close follow up was discussed. We reviewed warning signs and symptoms, and I instructed Ms. Cooper to return to the emergency department immediately if she develops any new or worsening symptoms. I provided additional verbal discharge instructions. Ms. Cooper expressed understanding and agreement with this plan of care, her questions were answered, and she was discharged in stable condition.     MEDICATIONS GIVEN IN THE EMERGENCY:  Medications - No data to display    NEW PRESCRIPTIONS STARTED AT TODAY'S ER VISIT:  Discharge Medication List as of 9/22/2022 11:45 PM             =================================================================    HPI    Patient information was obtained from: Patient     Use of : N/A      Tatum Cooper is a 32 year old female who presents with epigastric abdominal pain that radiates to the ride side of her chest and her right shoulder. She states she was cooking dinner when symptoms  began. Pain palliated with burping. She endorses abdominal distention and intermittent nausea. Denies vomiting. Patient denies shortness of breath or chest pain. Currently, patient reports pain has improved and is barely present. Patient denies any additional complaints at this time.    REVIEW OF SYSTEMS   Review of Systems   Respiratory: Negative for shortness of breath.    Cardiovascular: Negative for chest pain.   Gastrointestinal: Positive for abdominal distention, abdominal pain and nausea. Negative for vomiting.   All other systems reviewed and are negative.        PAST MEDICAL HISTORY:  Past Medical History:   Diagnosis Date     Allergic     food allergies--melons     Fracture of finger      Fracture of foot      Varicella     vaccine       PAST SURGICAL HISTORY:  Past Surgical History:   Procedure Laterality Date     CLOSED REDUCTION, PERCUTANEOUS PINNING  HAND, COMBINED  4/25/2014    Procedure: COMBINED CLOSED REDUCTION, PERCUTANEOUS PINNING HAND;  Surgeon: Ayala Rabago MD;  [facility]     FINGER SURGERY  2014    right      HC TOOTH EXTRACTION W/FORCEP      wisdom teeth     WISDOM TOOTH EXTRACTION Bilateral 2013       CURRENT MEDICATIONS:    No current facility-administered medications for this encounter.    Current Outpatient Medications:      estradiol (ESTRACE) 0.1 MG/GM vaginal cream, Apply a pea sized amount of cream to the affected area daily for 1 week, then 2-3 times per week for 2 weeks. Wash hands after application., Disp: 42.5 g, Rfl: 0     Fish Oil-Cholecalciferol (OMEGA-3 + D PO), , Disp: , Rfl:      magnesium 100 MG CAPS, , Disp: , Rfl:      Prenatal Vit-Fe Fumarate-FA (PRENATAL VITAMIN PO), , Disp: , Rfl:      Probiotic Product (PROBIOTIC PO), , Disp: , Rfl:      senna-docusate (SENOKOT-S/PERICOLACE) 8.6-50 MG tablet, Take 1 tablet by mouth daily as needed for constipation (Patient not taking: Reported on 7/2/2019), Disp: 20 tablet, Rfl: 0    ALLERGIES:  Allergies   Allergen  Reactions     Melon Hives     Peggy-Be [Norethindrone (Contraceptive)] Hives       FAMILY HISTORY:  Family History   Problem Relation Age of Onset     Breast Cancer Maternal Grandmother      Diabetes Maternal Grandmother      No Known Problems Mother      Hearing Loss Father      Asthma Brother      No Known Problems Maternal Aunt      Diabetes Maternal Uncle      Hyperlipidemia Maternal Uncle      Substance Abuse Paternal Aunt      Kidney Disease Maternal Grandfather        SOCIAL HISTORY:   Social History     Socioeconomic History     Marital status:      Spouse name: Aaron     Number of children: 0     Years of education: 18   Occupational History     Occupation:      Comment: [employer]   Tobacco Use     Smoking status: Never Smoker     Smokeless tobacco: Never Used   Substance and Sexual Activity     Alcohol use: Yes     Comment: Alcoholic Drinks/day: rarely     Drug use: No     Sexual activity: Yes     Partners: Male     Birth control/protection: None, OCP   Social History Narrative    How much exercise per week? 4-5 x's, some walking currently    How much calcium per day? Prenatal vitamin        How much caffeine per day? none    How much vitamin D per day? supplement    Do you/your family wear seatbelts?  Yes    Do you/your family use safety helmets? n/a    Do you/your family use sunscreen? Yes    Do you/your family keep firearms in the home? Yes, hunting    Do you/your family have a smoke detector(s)? No        October 11, 2018 Jeff Still LPN           PHYSICAL EXAM:    Vitals: /86   Pulse 83   Temp 97.9  F (36.6  C) (Temporal)   Resp 16   Wt 59 kg (130 lb)   LMP  (LMP Unknown)   SpO2 100%   BMI 22.31 kg/m     General:. Alert and interactive, comfortable appearing. Afebrile.   HENT: Oropharynx without erythema or exudates. MMM.  TMs clear bilaterally.  Eyes: Pupils mid-sized and equally reactive.   Neck: Full AROM.  No midline tenderness to  palpation.  Cardiovascular: Regular rate and rhythm. Peripheral pulses 2+ bilaterally.  Chest/Pulmonary: Normal work of breathing. Lung sounds clear and equal throughout, no wheezes or crackles. No chest wall tenderness or deformities.  Abdomen: Soft, nondistended. Mild epigastric tenderness with palpation without guarding or rebound.  Back/Spine: No CVA or midline tenderness.  Extremities: Normal ROM of all major joints. No lower extremity edema.   Skin: Warm and dry. Normal skin color.   Neuro: Speech clear. CNs grossly intact. Moves all extremities appropriately. Strength and sensation grossly intact to all extremities.   Psych: Normal affect/mood, cooperative, memory appropriate.     LAB:  All pertinent labs reviewed and interpreted.  Labs Ordered and Resulted from Time of ED Arrival to Time of ED Departure   BASIC METABOLIC PANEL - Abnormal       Result Value    Sodium 144      Potassium 3.7      Chloride 104      Carbon Dioxide (CO2) 28      Anion Gap 12      Urea Nitrogen 18.0      Creatinine 0.82      Calcium 9.4      Glucose 110 (*)     GFR Estimate >90     ROUTINE UA WITH MICROSCOPIC REFLEX TO CULTURE - Abnormal    Color Urine Colorless      Appearance Urine Clear      Glucose Urine Negative      Bilirubin Urine Negative      Ketones Urine Negative      Specific Gravity Urine 1.005      Blood Urine Negative      pH Urine 7.0      Protein Albumin Urine Negative      Urobilinogen Urine <2.0      Nitrite Urine Negative      Leukocyte Esterase Urine 250 Fátima/uL (*)     Bacteria Urine Few (*)     RBC Urine 1      WBC Urine 12 (*)     Squamous Epithelials Urine 4 (*)     Transitional Epithelials Urine <1     HCG QUALITATIVE PREGNANCY - Normal    hCG Serum Qualitative Negative     LIPASE - Normal    Lipase 32     HEPATIC FUNCTION PANEL - Normal    Protein Total 7.5      Albumin 4.7      Bilirubin Total 0.3      Alkaline Phosphatase 91      AST 23      ALT 18      Bilirubin Direct <0.20     CBC WITH PLATELETS AND  DIFFERENTIAL    WBC Count 7.0      RBC Count 4.95      Hemoglobin 13.6      Hematocrit 43.0      MCV 87      MCH 27.5      MCHC 31.6      RDW 12.6      Platelet Count 295      % Neutrophils 55      % Lymphocytes 38      % Monocytes 5      % Eosinophils 2      % Basophils 0      % Immature Granulocytes 0      NRBCs per 100 WBC 0      Absolute Neutrophils 3.8      Absolute Lymphocytes 2.6      Absolute Monocytes 0.4      Absolute Eosinophils 0.2      Absolute Basophils 0.0      Absolute Immature Granulocytes 0.0      Absolute NRBCs 0.0     URINE CULTURE       RADIOLOGY:  Abdomen US, limited (RUQ only)   Final Result   IMPRESSION:   1.  Gallbladder appears partially contracted but is otherwise negative.      2.  Remainder of the right upper quadrant ultrasound is negative.                EKG:  See MDM         I, Juancarlos Mancuso, am serving as a scribe to document services personally performed by Dr. Neetu Sprague  based on my observation and the provider's statements to me. I, Neetu Sprague MD attest that Juancarlos Mancuso is acting in a scribe capacity, has observed my performance of the services and has documented them in accordance with my direction.      Neetu Sprague M.D.  Emergency Medicine  [facility] EMERGENCY DEPARTMENT  [facility] [facility] [address]  [phone]  Dept: [phone]     Neetu Sprague MD  09/23/22 0022

## 2022-09-24 LAB
BACTERIA UR CULT: ABNORMAL

## 2022-09-27 LAB
ATRIAL RATE - MUSE: 59 BPM
DIASTOLIC BLOOD PRESSURE - MUSE: NORMAL MMHG
INTERPRETATION ECG - MUSE: NORMAL
P AXIS - MUSE: 55 DEGREES
PR INTERVAL - MUSE: 128 MS
QRS DURATION - MUSE: 118 MS
QT - MUSE: 414 MS
QTC - MUSE: 409 MS
R AXIS - MUSE: 65 DEGREES
SYSTOLIC BLOOD PRESSURE - MUSE: NORMAL MMHG
T AXIS - MUSE: 67 DEGREES
VENTRICULAR RATE- MUSE: 59 BPM

## 2022-09-28 ENCOUNTER — MYC MEDICAL ADVICE (OUTPATIENT)
Dept: FAMILY MEDICINE | Facility: CLINIC | Age: 32
End: 2022-09-28

## 2022-10-17 ENCOUNTER — OFFICE VISIT (OUTPATIENT)
Dept: CARDIOLOGY | Facility: CLINIC | Age: 32
End: 2022-10-17
Payer: COMMERCIAL

## 2022-10-17 VITALS
HEIGHT: 64 IN | RESPIRATION RATE: 16 BRPM | DIASTOLIC BLOOD PRESSURE: 60 MMHG | BODY MASS INDEX: 22.09 KG/M2 | SYSTOLIC BLOOD PRESSURE: 102 MMHG | HEART RATE: 94 BPM | WEIGHT: 129.4 LBS

## 2022-10-17 DIAGNOSIS — R00.2 PALPITATIONS: Primary | ICD-10-CM

## 2022-10-17 DIAGNOSIS — R07.89 ATYPICAL CHEST PAIN: ICD-10-CM

## 2022-10-17 PROCEDURE — 99203 OFFICE O/P NEW LOW 30 MIN: CPT | Performed by: INTERNAL MEDICINE

## 2022-10-17 NOTE — PATIENT INSTRUCTIONS
We will schedule a 24-hour monitor to make sure there are no concerning rhythm problems associated with your palpitations.  Be active while wearing the monitor, attempting to provoke symptoms, and keep track of your activities and symptoms in the diary.  Continue your regular exercise regimen.  There is no substitute!

## 2022-10-17 NOTE — LETTER
10/17/2022    Arielle Lewis MD  [facility] [address]  [place] [address]    RE: Tatum Cooper       Dear Colleague,     I had the pleasure of seeing Tatum Cooper in the [facility].    CARDIOLOGY CLINIC CONSULT NOTE     Assessment/Plan:   1.  Palpitations.  Reassured that these most likely represent benign arrhythmias.  We will check 24-hour Holter monitor to help define further.  No evidence of atrial fibrillation has been demonstrated thus far.  2.  Chest pain, atypical for angina, improved with Prilosec and not provoked by vigorous exercise.  Discussed that this most likely is due to reflux or some other GI etiology    Follow-up as needed     History of Present Illness:     It is my pleasure to see Tatum Cooper at the [facility] clinic for evaluation of palpitations and chest pain, along with abnormal ECG.    Tatum Cooper is a 32 year old female with a past medical history of good health with 2 childbirths, who is resuming her exercise regimen after her most recent child was born.  Few weeks ago she developed epigastric pain radiating into the right shoulder while preparing dinner, she presented to the emergency room for further evaluation.  ECG showed incomplete right bundle branch block but was otherwise normal, troponins were normal, ultrasound showed no cholelithiasis.  She was started on Prilosec which seemed to improve her symptoms, she does not take it regularly but takes it only as needed.    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  She has monitored her heart rhythm with a device that reportedly showed atrial fibrillation.  The device report was reviewed today, no evidence of atrial fibrillation is present.  There is baseline artifact and what appears to be a normal sinus rhythm at approximately 80 beats a minute.    She has noticed occasional palpitations particularly after  exercise, and occasionally at rest or less often during exercise.  She has had no syncope or falls with these.  They are unlike the symptoms she presented to the emergency room with.  She has occasionally continued to have low-grade episodes of epigastric discomfort for which she takes Prilosec as needed.    Past Medical History:     Patient Active Problem List   Diagnosis     Irregular menstrual cycle     Research study patient-Has mouthguard on [facility] and [facility], please give it to patient when admitted to [facility]     Labor and delivery indication for care or intervention     Encounter for triage in pregnant patient     Vaginal delivery       Past Surgical History:     Past Surgical History:   Procedure Laterality Date     CLOSED REDUCTION, PERCUTANEOUS PINNING  HAND, COMBINED  4/25/2014    Procedure: COMBINED CLOSED REDUCTION, PERCUTANEOUS PINNING HAND;  Surgeon: Ayala Rabago MD;  Location:  OR     FINGER SURGERY  2014    right      HC TOOTH EXTRACTION W/FORCEP      wisdom teeth     WISDOM TOOTH EXTRACTION Bilateral 2013       Family History:     Family History   Problem Relation Age of Onset     Breast Cancer Maternal Grandmother      Diabetes Maternal Grandmother      No Known Problems Mother      Hearing Loss Father      Asthma Brother      No Known Problems Maternal Aunt      Diabetes Maternal Uncle      Hyperlipidemia Maternal Uncle      Substance Abuse Paternal Aunt      Kidney Disease Maternal Grandfather      Infant son is noted to have right-sided aortic arch    Social History:    reports that she has never smoked. She has never used smokeless tobacco. She reports current alcohol use. She reports that she does not use drugs.    Exercise: Exercises on a [employer] with no provocation of chest discomfort or palpitations.    Sleep: Generally restorative    Meds:     Current Outpatient Medications   Medication Sig Dispense Refill     Fish Oil-Cholecalciferol (OMEGA-3 + D PO)        magnesium 100 MG CAPS     "    Prenatal Vit-Fe Fumarate-FA (PRENATAL VITAMIN PO)        Probiotic Product (PROBIOTIC PO)        senna-docusate (SENOKOT-S/PERICOLACE) 8.6-50 MG tablet Take 1 tablet by mouth daily as needed for constipation (Patient not taking: Reported on 7/2/2019) 20 tablet 0       Allergies:   Melon and Peggy-be [norethindrone (contraceptive)]    Review of Systems:     Positive for nosebleeds, chest pain, shortness of breath with activity, irregular heartbeat, palpitations, nausea, anxiety.  12 point review of systems otherwise     Please refer above for cardiac ROS details.       Objective:      Physical Exam  58.7 kg (129 lb 6.4 oz)  1.626 m (5' 4\")  Body mass index is 22.21 kg/m .  /60 (BP Location: Left arm, Patient Position: Sitting, Cuff Size: Adult Regular)   Pulse 94   Resp 16   Ht 1.626 m (5' 4\")   Wt 58.7 kg (129 lb 6.4 oz)   LMP  (LMP Unknown)   BMI 22.21 kg/m          General Appearance : Awake, Alert, No acute distress  HEENT: No Scleral icterus; the mucous membranes were pink and moist.  Conjunctivae not injected  Neck:  No cervical bruits, jugular venous distention, or thyromegaly   Chest: The spine was straight. Chest wall symmetric  Lungs: Respirations unlabored; the lungs are clear to auscultation.  No wheezing   Cardiovascular:   Normal point of maximal impulse.  Auscultation reveals normal first and second heart sounds with no murmurs, rubs, or gallops.  Carotid, radial, and dorsalis pedal pulses are intact and symmetric.  Abdomen: Flat.  No organomegaly, masses, bruits, or tenderness. Bowels sounds are present  Extremities: No edema  Skin: No xanthelasma. Warm, Dry.  Musculoskeletal: No tenderness.  Neurologic: Alert and oriented ×3. Speech is fluent.      EKG (personally reviewed):  9/22/2022.  Sinus rhythm 59 bpm.  Incomplete right bundle branch block    Cardiac Imaging Studies:      Lab Review   Lab Results   Component Value Date     09/22/2022    CO2 28 09/22/2022    BUN 18.0 " 09/22/2022     Lab Results   Component Value Date    WBC 7.0 09/22/2022    WBC 9.6 05/21/2019    HGB 13.6 09/22/2022    HGB 11.5 05/22/2019    HCT 43.0 09/22/2022    HCT 41.4 05/21/2019    MCV 87 09/22/2022    MCV 94 05/21/2019     09/22/2022     05/21/2019     Lab Results   Component Value Date    CHOL 202 05/10/2018    TRIG 46 05/10/2018     05/10/2018     No results found for: TROPONINI  No results found for: BNP  Lab Results   Component Value Date    TSH 2.82 07/10/2019       Pako Pagan MD, MD [facility]      This note created using Dragon voice recognition software. Sound alike errors may have escaped editing.       Thank you for allowing me to participate in the care of your patient.      Sincerely,     Pako Pagan MD     [facility]  cc:   Referred Self,

## 2022-10-17 NOTE — PROGRESS NOTES
CARDIOLOGY CLINIC CONSULT NOTE     Assessment/Plan:   1.  Palpitations.  Reassured that these most likely represent benign arrhythmias.  We will check 24-hour Holter monitor to help define further.  No evidence of atrial fibrillation has been demonstrated thus far.  2.  Chest pain, atypical for angina, improved with Prilosec and not provoked by vigorous exercise.  Discussed that this most likely is due to reflux or some other GI etiology    Follow-up as needed     History of Present Illness:     It is my pleasure to see Tatum Cooper at the [facility] clinic for evaluation of palpitations and chest pain, along with abnormal ECG.    Tatum Cooper is a 32 year old female with a past medical history of good health with 2 childbirths, who is resuming her exercise regimen after her most recent child was born.  Few weeks ago she developed epigastric pain radiating into the right shoulder while preparing dinner, she presented to the emergency room for further evaluation.  ECG showed incomplete right bundle branch block but was otherwise normal, troponins were normal, ultrasound showed no cholelithiasis.  She was started on Prilosec which seemed to improve her symptoms, she does not take it regularly but takes it only as needed.    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  She has monitored her heart rhythm with a device that reportedly showed atrial fibrillation.  The device report was reviewed today, no evidence of atrial fibrillation is present.  There is baseline artifact and what appears to be a normal sinus rhythm at approximately 80 beats a minute.    She has noticed occasional palpitations particularly after exercise, and occasionally at rest or less often during exercise.  She has had no syncope or falls with these.  They are unlike the symptoms she presented to the emergency room with.  She has occasionally continued to have low-grade episodes  of epigastric discomfort for which she takes Prilosec as needed.    Past Medical History:     Patient Active Problem List   Diagnosis     Irregular menstrual cycle     Research study patient-Has mouthguard on L and D, please give it to patient when admitted to L and D     Labor and delivery indication for care or intervention     Encounter for triage in pregnant patient     Vaginal delivery       Past Surgical History:     Past Surgical History:   Procedure Laterality Date     CLOSED REDUCTION, PERCUTANEOUS PINNING  HAND, COMBINED  4/25/2014    Procedure: COMBINED CLOSED REDUCTION, PERCUTANEOUS PINNING HAND;  Surgeon: Ayala Rabago MD;  [facility]     FINGER SURGERY  2014    right      HC TOOTH EXTRACTION W/FORCEP      wisdom teeth     WISDOM TOOTH EXTRACTION Bilateral 2013       Family History:     Family History   Problem Relation Age of Onset     Breast Cancer Maternal Grandmother      Diabetes Maternal Grandmother      No Known Problems Mother      Hearing Loss Father      Asthma Brother      No Known Problems Maternal Aunt      Diabetes Maternal Uncle      Hyperlipidemia Maternal Uncle      Substance Abuse Paternal Aunt      Kidney Disease Maternal Grandfather      Infant son is noted to have right-sided aortic arch    Social History:    reports that she has never smoked. She has never used smokeless tobacco. She reports current alcohol use. She reports that she does not use drugs.    Exercise: Exercises on a [employer] with no provocation of chest discomfort or palpitations.    Sleep: Generally restorative    Meds:     Current Outpatient Medications   Medication Sig Dispense Refill     Fish Oil-Cholecalciferol (OMEGA-3 + D PO)        magnesium 100 MG CAPS        Prenatal Vit-Fe Fumarate-FA (PRENATAL VITAMIN PO)        Probiotic Product (PROBIOTIC PO)        senna-docusate (SENOKOT-S/PERICOLACE) 8.6-50 MG tablet Take 1 tablet by mouth daily as needed for constipation (Patient not taking: Reported  "on 7/2/2019) 20 tablet 0       Allergies:   Melon and Peggy-be [norethindrone (contraceptive)]    Review of Systems:     Positive for nosebleeds, chest pain, shortness of breath with activity, irregular heartbeat, palpitations, nausea, anxiety.  12 point review of systems otherwise     Please refer above for cardiac ROS details.       Objective:      Physical Exam  58.7 kg (129 lb 6.4 oz)  1.626 m (5' 4\")  Body mass index is 22.21 kg/m .  /60 (BP Location: Left arm, Patient Position: Sitting, Cuff Size: Adult Regular)   Pulse 94   Resp 16   Ht 1.626 m (5' 4\")   Wt 58.7 kg (129 lb 6.4 oz)   LMP  (LMP Unknown)   BMI 22.21 kg/m          General Appearance : Awake, Alert, No acute distress  HEENT: No Scleral icterus; the mucous membranes were pink and moist.  Conjunctivae not injected  Neck:  No cervical bruits, jugular venous distention, or thyromegaly   Chest: The spine was straight. Chest wall symmetric  Lungs: Respirations unlabored; the lungs are clear to auscultation.  No wheezing   Cardiovascular:   Normal point of maximal impulse.  Auscultation reveals normal first and second heart sounds with no murmurs, rubs, or gallops.  Carotid, radial, and dorsalis pedal pulses are intact and symmetric.  Abdomen: Flat.  No organomegaly, masses, bruits, or tenderness. Bowels sounds are present  Extremities: No edema  Skin: No xanthelasma. Warm, Dry.  Musculoskeletal: No tenderness.  Neurologic: Alert and oriented ×3. Speech is fluent.      EKG (personally reviewed):  9/22/2022.  Sinus rhythm 59 bpm.  Incomplete right bundle branch block    Cardiac Imaging Studies:      Lab Review   Lab Results   Component Value Date     09/22/2022    CO2 28 09/22/2022    BUN 18.0 09/22/2022     Lab Results   Component Value Date    WBC 7.0 09/22/2022    WBC 9.6 05/21/2019    HGB 13.6 09/22/2022    HGB 11.5 05/22/2019    HCT 43.0 09/22/2022    HCT 41.4 05/21/2019    MCV 87 09/22/2022    MCV 94 05/21/2019     09/22/2022 "     05/21/2019     Lab Results   Component Value Date    CHOL 202 05/10/2018    TRIG 46 05/10/2018     05/10/2018     No results found for: TROPONINI  No results found for: BNP  Lab Results   Component Value Date    TSH 2.82 07/10/2019       Pako Pagan MD, MD FACC      This note created using Dragon voice recognition software. Sound alike errors may have escaped editing.

## 2022-10-27 ENCOUNTER — HOSPITAL ENCOUNTER (OUTPATIENT)
Dept: CARDIOLOGY | Facility: HOSPITAL | Age: 32
Discharge: HOME | End: 2022-10-27
Attending: INTERNAL MEDICINE | Admitting: INTERNAL MEDICINE
Payer: COMMERCIAL

## 2022-10-27 DIAGNOSIS — R00.2 PALPITATIONS: ICD-10-CM

## 2022-10-27 DIAGNOSIS — R07.89 ATYPICAL CHEST PAIN: ICD-10-CM

## 2022-10-27 PROCEDURE — 93226 XTRNL ECG REC<48 HR SCAN A/R: CPT

## 2022-10-27 PROCEDURE — 93227 XTRNL ECG REC<48 HR R&I: CPT | Performed by: INTERNAL MEDICINE

## 2022-12-23 ENCOUNTER — TRANSFERRED RECORDS (OUTPATIENT)
Dept: HEALTH INFORMATION MANAGEMENT | Facility: CLINIC | Age: 32
End: 2022-12-23

## 2023-06-03 ENCOUNTER — HEALTH MAINTENANCE LETTER (OUTPATIENT)
Age: 33
End: 2023-06-03

## 2023-07-25 NOTE — PROGRESS NOTES
28 year old female with LMP 5/14/18 presents for gynecologic ultrasound indicated by irregular menstrual cycles.  This study was done transvaginally.    Uterine findings:   Presence: Visible Size: Normal 4.5 x 4.5 x 2.8 cm.  Endometrium = 7.0  mm.   Cx length =  31.6 mm.      Flexion:  Anteverted Position: Midline Margins: Smooth Shape: Normal   Contour: Regular Texture: Homogeneous Cavity: Normal Masses: Abnormal- Bulky anterior right uterus.    Pelvic findings:    Right Adnexa: Normal   Left Adnexa: Normal   Bladder:  Normal         Cul - de - sac fluid: None    Ovarian follicles:   Right ovary:  2.3 x 2.1 x 2.4cm.     1 follicles     Size(s):  1.7 x 1.7 x 1.5cm     Left ovary:  2.1 x 1.9 x 1.8cm.     0 follicles      Comments:  Normal appearing uterus and endometrial stripe.  Right ovary with a dominant follicle.    APOLONIA Saeed MD, FACOG       Dupixent Amount: 300 mg

## 2023-10-23 ENCOUNTER — HOSPITAL ENCOUNTER (OUTPATIENT)
Facility: HOSPITAL | Age: 33
Setting detail: OBSERVATION
Discharge: HOME | End: 2023-10-24
Attending: EMERGENCY MEDICINE | Admitting: OBSTETRICS & GYNECOLOGY
Payer: COMMERCIAL

## 2023-10-23 ENCOUNTER — APPOINTMENT (OUTPATIENT)
Dept: ULTRASOUND IMAGING | Facility: HOSPITAL | Age: 33
End: 2023-10-23
Attending: EMERGENCY MEDICINE
Payer: COMMERCIAL

## 2023-10-23 DIAGNOSIS — O03.4 RETAINED PRODUCTS OF CONCEPTION AFTER MISCARRIAGE: ICD-10-CM

## 2023-10-23 DIAGNOSIS — Z98.890 S/P D&C (STATUS POST DILATION AND CURETTAGE): Primary | ICD-10-CM

## 2023-10-23 LAB
HCG INTACT+B SERPL-ACNC: 1386 MIU/ML
HGB BLD-MCNC: 12.5 G/DL (ref 11.7–15.7)
HGB BLD-MCNC: 13.7 G/DL (ref 11.7–15.7)

## 2023-10-23 PROCEDURE — 96361 HYDRATE IV INFUSION ADD-ON: CPT

## 2023-10-23 PROCEDURE — 99285 EMERGENCY DEPT VISIT HI MDM: CPT | Mod: 25

## 2023-10-23 PROCEDURE — 36415 COLL VENOUS BLD VENIPUNCTURE: CPT | Performed by: EMERGENCY MEDICINE

## 2023-10-23 PROCEDURE — 36415 COLL VENOUS BLD VENIPUNCTURE: CPT | Performed by: OBSTETRICS & GYNECOLOGY

## 2023-10-23 PROCEDURE — 84702 CHORIONIC GONADOTROPIN TEST: CPT | Performed by: EMERGENCY MEDICINE

## 2023-10-23 PROCEDURE — 250N000013 HC RX MED GY IP 250 OP 250 PS 637: Performed by: OBSTETRICS & GYNECOLOGY

## 2023-10-23 PROCEDURE — 258N000003 HC RX IP 258 OP 636: Performed by: OBSTETRICS & GYNECOLOGY

## 2023-10-23 PROCEDURE — 76801 OB US < 14 WKS SINGLE FETUS: CPT

## 2023-10-23 PROCEDURE — G0378 HOSPITAL OBSERVATION PER HR: HCPCS

## 2023-10-23 PROCEDURE — 85018 HEMOGLOBIN: CPT | Performed by: EMERGENCY MEDICINE

## 2023-10-23 PROCEDURE — 85018 HEMOGLOBIN: CPT | Performed by: OBSTETRICS & GYNECOLOGY

## 2023-10-23 RX ORDER — TEA TREE OIL 100 %
1 OIL (ML) TOPICAL DAILY
COMMUNITY

## 2023-10-23 RX ORDER — ALBUTEROL SULFATE 90 UG/1
1 AEROSOL, METERED RESPIRATORY (INHALATION) EVERY 4 HOURS PRN
COMMUNITY
Start: 2022-11-11

## 2023-10-23 RX ORDER — DIPHENHYDRAMINE HCL 25 MG
25 CAPSULE ORAL
Status: DISCONTINUED | OUTPATIENT
Start: 2023-10-23 | End: 2023-10-24 | Stop reason: HOSPADM

## 2023-10-23 RX ORDER — ACETAMINOPHEN 325 MG/1
650 TABLET ORAL EVERY 4 HOURS PRN
Status: DISCONTINUED | OUTPATIENT
Start: 2023-10-23 | End: 2023-10-24 | Stop reason: HOSPADM

## 2023-10-23 RX ORDER — SODIUM CHLORIDE 9 MG/ML
INJECTION, SOLUTION INTRAVENOUS CONTINUOUS
Status: DISCONTINUED | OUTPATIENT
Start: 2023-10-23 | End: 2023-10-24 | Stop reason: HOSPADM

## 2023-10-23 RX ADMIN — SODIUM CHLORIDE: 9 INJECTION, SOLUTION INTRAVENOUS at 21:22

## 2023-10-23 RX ADMIN — DIPHENHYDRAMINE HYDROCHLORIDE 25 MG: 25 CAPSULE ORAL at 21:48

## 2023-10-23 ASSESSMENT — ACTIVITIES OF DAILY LIVING (ADL)
ADLS_ACUITY_SCORE: 35
ADLS_ACUITY_SCORE: 35

## 2023-10-23 NOTE — ED PROVIDER NOTES
EMERGENCY DEPARTMENT ENCOUNTER      NAME: Tatum Cooper  AGE: 33 year old female  YOB: 1990  MRN: [id number]  EVALUATION DATE & TIME: No admission date for patient encounter.    PCP: Arielle Lewis    ED PROVIDER: Analia Barnes MD    Chief Complaint   Patient presents with    Vaginal Bleeding         FINAL IMPRESSION:  1. Retained products of conception after miscarriage          ED COURSE & MEDICAL DECISION MAKING:    Pertinent Labs & Imaging studies reviewed. (See chart for details)  33 year old female with history of G3, P2 who presents to the Emergency Department for evaluation of heavy vaginal bleeding for the last 2 to 3 hours prior to arrival after she had an intrauterine fetal demise.  She was possibly 10 weeks but only measuring 6.  On the 15th took 800 of misoprostol vaginally and had cramping bleeding.  On the 19th had repeat ultrasound without evidence of intrauterine pregnancy.  However worsening bleeding today concerning for retained products of conception.  Patient shows me pictures and she has golf ball to tennis ball sized deflated clot.  Thankfully she is not lightheaded or dizzy and per chart review her most recent hemoglobin was 14+.    Patient initially seen evaluated by myself in triage area due to boarding crisis.  Hemoglobin is normal.  Beta quant 1386.  Ultrasound shows evidence of retained products.  OB/GYN consulted and will come to ED to evaluate patient for potential D&C..  Signed out to oncoming physician awaiting results of same.      ED Course as of 10/23/23 1835   Mon Oct 23, 2023   1645 I met with the patient, obtained history, performed an initial exam, and discussed options and plan for diagnostics and treatment here in the ED.   1757 hCG Quantitative(!): 1,386   1831 Spoke axel Navarro        Medical Decision Making    History:  Supplemental history from:   External Record(s) reviewed: Documented in chart, if applicable.    Work Up:  Chart documentation  includes differential considered and any EKGs or imaging independently interpreted by provider, see MDM  In additional to work up documented, I considered the following work up: see MDM    External consultation:  Discussion of management with another provider: OB    Complicating factors:  Care impacted by chronic illness: N/A  Care affected by social determinants of health: Access to Medical Care referred to ED    Disposition considerations: Admission considered. Patient was signed out to the oncoming physician, disposition pending.        At the conclusion of the encounter I discussed the results of all of the tests and the disposition. The questions were answered. The patient or family acknowledged understanding and was agreeable with the care plan.        MEDICATIONS GIVEN IN THE EMERGENCY:  Medications - No data to display    NEW PRESCRIPTIONS STARTED AT TODAY'S ER VISIT  New Prescriptions    No medications on file          =================================================================    HPI    Patient information was obtained from: patient     Use of Intrepreter: N/A       Tatum Cooper is a 33 year old female with pertinent medical history of irregular menstrual cycle who presents vaginal bleeding.    Patient reports fetal demise. She was supposed to be at 10 weeks pregnant, but the fetus has not progressed beyond 6 weeks. She waited a week, hoping to miscarry naturally. On 10/15, she took 800 mg misoprostol vaginally. On 10/19, she had a repeat vaginal US and there was no sac. Patient has had continuous bleeding and at 2 PM today had heavy bleeding. She sees a nurse-midwife group and they told her to come to the ED today. She does not have an OB.      PAST MEDICAL HISTORY:  Past Medical History:   Diagnosis Date    Allergic     food allergies--melons    Fracture of finger     Fracture of foot     Varicella     vaccine       PAST SURGICAL HISTORY:  Past Surgical History:   Procedure Laterality Date     "CLOSED REDUCTION, PERCUTANEOUS PINNING  HAND, COMBINED  4/25/2014    Procedure: COMBINED CLOSED REDUCTION, PERCUTANEOUS PINNING HAND;  Surgeon: Ayala Rabago MD;  [facility]    FINGER SURGERY  2014    right     HC TOOTH EXTRACTION W/FORCEP      wisdom teeth    WISDOM TOOTH EXTRACTION Bilateral 2013       CURRENT MEDICATIONS:    Cannot display prior to admission medications because the patient has not been admitted in this contact.       ALLERGIES:  Allergies   Allergen Reactions    Melon Hives    Peggy-Be [Norethindrone] Hives       FAMILY HISTORY:  Family History   Problem Relation Age of Onset    Breast Cancer Maternal Grandmother     Diabetes Maternal Grandmother     No Known Problems Mother     Hearing Loss Father     Asthma Brother     No Known Problems Maternal Aunt     Diabetes Maternal Uncle     Hyperlipidemia Maternal Uncle     Substance Abuse Paternal Aunt     Kidney Disease Maternal Grandfather        SOCIAL HISTORY:  Social History     Tobacco Use    Smoking status: Never    Smokeless tobacco: Never   Substance Use Topics    Alcohol use: Yes     Comment: Alcoholic Drinks/day: rarely    Drug use: No        VITALS:  Patient Vitals for the past 24 hrs:   BP Temp Temp src Pulse Resp SpO2 Height Weight   10/23/23 1603 127/87 97.9  F (36.6  C) Oral 83 18 100 % 1.626 m (5' 4\") 59 kg (130 lb)       PHYSICAL EXAM    General Appearance: Well-appearing, well-nourished, no acute distress.  Cardio:  Regular rate and rhythm  Pulm:  No respiratory distress  Abdomen:  Soft, non-tender, non distended,no rebound or guarding.  Extremities: Normal gait  Skin:  Skin warm, dry, no rashes  Neuro:  Alert and oriented ×3     RADIOLOGY/LABS:  Reviewed all pertinent imaging. Please see official radiology report. All pertinent labs reviewed and interpreted.    Results for orders placed or performed during the hospital encounter of 10/23/23   OB  US 1st trimester w transvag    Impression    IMPRESSION:   1.  No " intrauterine gestational sac is identified. The endometrium is heterogeneous and thickened with increased vascularity along the mid to distal endometrium concerning for retained products of conception. Recommend ultrasound follow-up as clinically   indicated.   HCG quantitative pregnancy (blood)   Result Value Ref Range    hCG Quantitative 1,386 (H) <5 mIU/mL   Result Value Ref Range    Hemoglobin 13.7 11.7 - 15.7 g/dL       The creation of this record is based on the scribe s observations of the work being performed by Analia Barnes MD and the provider s statements to them. It was created on her behalf by Ciara Iverson, a trained medical scribe. This document has been checked and approved by the attending provider.    Analia Barnes MD  Emergency Medicine  [facility] EMERGENCY DEPARTMENT  [facility] [facility] [id number] [phone]  Dept: [phone]       Anaila Barnes MD  10/23/23 4383

## 2023-10-23 NOTE — ED TRIAGE NOTES
Patient arrives to ER for c/o vaginal bleeding.  Started taking Misoprostol on the 10/15.  Has had on/off bleeding since then.  Had an ultrasound 10/19th and could not see a sac.  Here today for having excessive bleeding starting today at 2pm. 2 pads/hour. Has noticed clots.      Feels anxious. Denies dizziness/lightheadedness.      Triage Assessment (Adult)       Row Name 10/23/23 1600          Triage Assessment    Airway WDL WDL        Respiratory WDL    Respiratory WDL WDL        Skin Circulation/Temperature WDL    Skin Circulation/Temperature WDL WDL        Cardiac WDL    Cardiac WDL WDL        Peripheral/Neurovascular WDL    Peripheral Neurovascular WDL WDL        Cognitive/Neuro/Behavioral WDL    Cognitive/Neuro/Behavioral WDL WDL

## 2023-10-24 ENCOUNTER — ANESTHESIA (OUTPATIENT)
Dept: SURGERY | Facility: HOSPITAL | Age: 33
End: 2023-10-24
Payer: COMMERCIAL

## 2023-10-24 ENCOUNTER — ANESTHESIA EVENT (OUTPATIENT)
Dept: SURGERY | Facility: HOSPITAL | Age: 33
End: 2023-10-24
Payer: COMMERCIAL

## 2023-10-24 VITALS
HEART RATE: 54 BPM | TEMPERATURE: 98 F | WEIGHT: 130 LBS | HEIGHT: 64 IN | OXYGEN SATURATION: 100 % | SYSTOLIC BLOOD PRESSURE: 107 MMHG | BODY MASS INDEX: 22.2 KG/M2 | RESPIRATION RATE: 16 BRPM | DIASTOLIC BLOOD PRESSURE: 64 MMHG

## 2023-10-24 LAB
GLUCOSE BLDC GLUCOMTR-MCNC: 84 MG/DL (ref 70–99)
HGB BLD-MCNC: 11.6 G/DL (ref 11.7–15.7)

## 2023-10-24 PROCEDURE — 85018 HEMOGLOBIN: CPT | Performed by: OBSTETRICS & GYNECOLOGY

## 2023-10-24 PROCEDURE — 250N000011 HC RX IP 250 OP 636: Performed by: ANESTHESIOLOGY

## 2023-10-24 PROCEDURE — 96361 HYDRATE IV INFUSION ADD-ON: CPT

## 2023-10-24 PROCEDURE — 370N000017 HC ANESTHESIA TECHNICAL FEE, PER MIN: Performed by: STUDENT IN AN ORGANIZED HEALTH CARE EDUCATION/TRAINING PROGRAM

## 2023-10-24 PROCEDURE — 250N000009 HC RX 250: Performed by: STUDENT IN AN ORGANIZED HEALTH CARE EDUCATION/TRAINING PROGRAM

## 2023-10-24 PROCEDURE — 258N000003 HC RX IP 258 OP 636: Performed by: ANESTHESIOLOGY

## 2023-10-24 PROCEDURE — 36415 COLL VENOUS BLD VENIPUNCTURE: CPT | Performed by: OBSTETRICS & GYNECOLOGY

## 2023-10-24 PROCEDURE — 82962 GLUCOSE BLOOD TEST: CPT

## 2023-10-24 PROCEDURE — 250N000011 HC RX IP 250 OP 636: Mod: JZ | Performed by: ANESTHESIOLOGY

## 2023-10-24 PROCEDURE — 258N000003 HC RX IP 258 OP 636: Performed by: OBSTETRICS & GYNECOLOGY

## 2023-10-24 PROCEDURE — 250N000013 HC RX MED GY IP 250 OP 250 PS 637: Performed by: STUDENT IN AN ORGANIZED HEALTH CARE EDUCATION/TRAINING PROGRAM

## 2023-10-24 PROCEDURE — 710N000012 HC RECOVERY PHASE 2, PER MINUTE: Performed by: STUDENT IN AN ORGANIZED HEALTH CARE EDUCATION/TRAINING PROGRAM

## 2023-10-24 PROCEDURE — 96374 THER/PROPH/DIAG INJ IV PUSH: CPT | Mod: XU

## 2023-10-24 PROCEDURE — 88305 TISSUE EXAM BY PATHOLOGIST: CPT | Mod: TC | Performed by: STUDENT IN AN ORGANIZED HEALTH CARE EDUCATION/TRAINING PROGRAM

## 2023-10-24 PROCEDURE — 272N000001 HC OR GENERAL SUPPLY STERILE: Performed by: STUDENT IN AN ORGANIZED HEALTH CARE EDUCATION/TRAINING PROGRAM

## 2023-10-24 PROCEDURE — 250N000009 HC RX 250: Performed by: ANESTHESIOLOGY

## 2023-10-24 PROCEDURE — G0378 HOSPITAL OBSERVATION PER HR: HCPCS

## 2023-10-24 PROCEDURE — 360N000075 HC SURGERY LEVEL 2, PER MIN: Performed by: STUDENT IN AN ORGANIZED HEALTH CARE EDUCATION/TRAINING PROGRAM

## 2023-10-24 PROCEDURE — 250N000013 HC RX MED GY IP 250 OP 250 PS 637: Performed by: ANESTHESIOLOGY

## 2023-10-24 PROCEDURE — 999N000141 HC STATISTIC PRE-PROCEDURE NURSING ASSESSMENT: Performed by: STUDENT IN AN ORGANIZED HEALTH CARE EDUCATION/TRAINING PROGRAM

## 2023-10-24 RX ORDER — OXYCODONE HYDROCHLORIDE 5 MG/1
5 TABLET ORAL
Status: DISCONTINUED | OUTPATIENT
Start: 2023-10-24 | End: 2023-10-24 | Stop reason: HOSPADM

## 2023-10-24 RX ORDER — DEXAMETHASONE SODIUM PHOSPHATE 10 MG/ML
INJECTION, SOLUTION INTRAMUSCULAR; INTRAVENOUS PRN
Status: DISCONTINUED | OUTPATIENT
Start: 2023-10-24 | End: 2023-10-24

## 2023-10-24 RX ORDER — LIDOCAINE 40 MG/G
CREAM TOPICAL
Status: DISCONTINUED | OUTPATIENT
Start: 2023-10-24 | End: 2023-10-24 | Stop reason: HOSPADM

## 2023-10-24 RX ORDER — ACETAMINOPHEN 325 MG/1
975 TABLET ORAL ONCE
Status: DISCONTINUED | OUTPATIENT
Start: 2023-10-24 | End: 2023-10-24 | Stop reason: HOSPADM

## 2023-10-24 RX ORDER — HALOPERIDOL 5 MG/ML
1 INJECTION INTRAMUSCULAR
Status: DISCONTINUED | OUTPATIENT
Start: 2023-10-24 | End: 2023-10-24 | Stop reason: HOSPADM

## 2023-10-24 RX ORDER — ONDANSETRON 2 MG/ML
4 INJECTION INTRAMUSCULAR; INTRAVENOUS EVERY 6 HOURS PRN
Status: DISCONTINUED | OUTPATIENT
Start: 2023-10-24 | End: 2023-10-24 | Stop reason: HOSPADM

## 2023-10-24 RX ORDER — SODIUM CHLORIDE, SODIUM LACTATE, POTASSIUM CHLORIDE, CALCIUM CHLORIDE 600; 310; 30; 20 MG/100ML; MG/100ML; MG/100ML; MG/100ML
INJECTION, SOLUTION INTRAVENOUS CONTINUOUS
Status: DISCONTINUED | OUTPATIENT
Start: 2023-10-24 | End: 2023-10-24 | Stop reason: HOSPADM

## 2023-10-24 RX ORDER — KETOROLAC TROMETHAMINE 30 MG/ML
INJECTION, SOLUTION INTRAMUSCULAR; INTRAVENOUS PRN
Status: DISCONTINUED | OUTPATIENT
Start: 2023-10-24 | End: 2023-10-24

## 2023-10-24 RX ORDER — IBUPROFEN 600 MG/1
600 TABLET, FILM COATED ORAL EVERY 6 HOURS PRN
COMMUNITY
Start: 2023-10-24

## 2023-10-24 RX ORDER — FENTANYL CITRATE 50 UG/ML
INJECTION, SOLUTION INTRAMUSCULAR; INTRAVENOUS PRN
Status: DISCONTINUED | OUTPATIENT
Start: 2023-10-24 | End: 2023-10-24

## 2023-10-24 RX ORDER — ACETAMINOPHEN 325 MG/1
650 TABLET ORAL EVERY 6 HOURS PRN
COMMUNITY
Start: 2023-10-24

## 2023-10-24 RX ORDER — LIDOCAINE HYDROCHLORIDE 10 MG/ML
INJECTION, SOLUTION INFILTRATION; PERINEURAL PRN
Status: DISCONTINUED | OUTPATIENT
Start: 2023-10-24 | End: 2023-10-24 | Stop reason: HOSPADM

## 2023-10-24 RX ORDER — FENTANYL CITRATE 50 UG/ML
25 INJECTION, SOLUTION INTRAMUSCULAR; INTRAVENOUS
Status: DISCONTINUED | OUTPATIENT
Start: 2023-10-24 | End: 2023-10-24 | Stop reason: HOSPADM

## 2023-10-24 RX ORDER — ONDANSETRON 4 MG/1
4 TABLET, ORALLY DISINTEGRATING ORAL EVERY 30 MIN PRN
Status: DISCONTINUED | OUTPATIENT
Start: 2023-10-24 | End: 2023-10-24 | Stop reason: HOSPADM

## 2023-10-24 RX ORDER — PROPOFOL 10 MG/ML
INJECTION, EMULSION INTRAVENOUS CONTINUOUS PRN
Status: DISCONTINUED | OUTPATIENT
Start: 2023-10-24 | End: 2023-10-24

## 2023-10-24 RX ORDER — LIDOCAINE HYDROCHLORIDE 10 MG/ML
INJECTION, SOLUTION INFILTRATION; PERINEURAL PRN
Status: DISCONTINUED | OUTPATIENT
Start: 2023-10-24 | End: 2023-10-24

## 2023-10-24 RX ORDER — ONDANSETRON 2 MG/ML
4 INJECTION INTRAMUSCULAR; INTRAVENOUS EVERY 30 MIN PRN
Status: DISCONTINUED | OUTPATIENT
Start: 2023-10-24 | End: 2023-10-24 | Stop reason: HOSPADM

## 2023-10-24 RX ORDER — ATROPINE SULFATE 0.4 MG/ML
AMPUL (ML) INJECTION
Status: DISCONTINUED
Start: 2023-10-24 | End: 2023-10-24 | Stop reason: HOSPADM

## 2023-10-24 RX ORDER — ACETAMINOPHEN 325 MG/1
975 TABLET ORAL ONCE
Status: COMPLETED | OUTPATIENT
Start: 2023-10-24 | End: 2023-10-24

## 2023-10-24 RX ORDER — IBUPROFEN 200 MG
800 TABLET ORAL ONCE
Status: DISCONTINUED | OUTPATIENT
Start: 2023-10-24 | End: 2023-10-24 | Stop reason: HOSPADM

## 2023-10-24 RX ORDER — DOXYCYCLINE 100 MG/1
200 CAPSULE ORAL ONCE
Status: COMPLETED | OUTPATIENT
Start: 2023-10-24 | End: 2023-10-24

## 2023-10-24 RX ORDER — ACETAMINOPHEN 325 MG/1
975 TABLET ORAL ONCE
Status: DISCONTINUED | OUTPATIENT
Start: 2023-10-24 | End: 2023-10-24

## 2023-10-24 RX ORDER — SODIUM CHLORIDE, SODIUM LACTATE, POTASSIUM CHLORIDE, CALCIUM CHLORIDE 600; 310; 30; 20 MG/100ML; MG/100ML; MG/100ML; MG/100ML
INJECTION, SOLUTION INTRAVENOUS CONTINUOUS PRN
Status: DISCONTINUED | OUTPATIENT
Start: 2023-10-24 | End: 2023-10-24

## 2023-10-24 RX ADMIN — SODIUM CHLORIDE, POTASSIUM CHLORIDE, SODIUM LACTATE AND CALCIUM CHLORIDE: 600; 310; 30; 20 INJECTION, SOLUTION INTRAVENOUS at 10:49

## 2023-10-24 RX ADMIN — SODIUM CHLORIDE, POTASSIUM CHLORIDE, SODIUM LACTATE AND CALCIUM CHLORIDE: 600; 310; 30; 20 INJECTION, SOLUTION INTRAVENOUS at 13:20

## 2023-10-24 RX ADMIN — FENTANYL CITRATE 50 MCG: 50 INJECTION INTRAMUSCULAR; INTRAVENOUS at 13:26

## 2023-10-24 RX ADMIN — KETOROLAC TROMETHAMINE 30 MG: 30 INJECTION, SOLUTION INTRAMUSCULAR at 13:32

## 2023-10-24 RX ADMIN — PROPOFOL 150 MCG/KG/MIN: 10 INJECTION, EMULSION INTRAVENOUS at 13:20

## 2023-10-24 RX ADMIN — SODIUM CHLORIDE: 9 INJECTION, SOLUTION INTRAVENOUS at 05:00

## 2023-10-24 RX ADMIN — LIDOCAINE HYDROCHLORIDE 3 ML: 10 INJECTION, SOLUTION INFILTRATION; PERINEURAL at 13:20

## 2023-10-24 RX ADMIN — ACETAMINOPHEN 975 MG: 325 TABLET ORAL at 10:49

## 2023-10-24 RX ADMIN — MIDAZOLAM 2 MG: 1 INJECTION INTRAMUSCULAR; INTRAVENOUS at 13:17

## 2023-10-24 RX ADMIN — DOXYCYCLINE HYCLATE 200 MG: 100 CAPSULE ORAL at 10:49

## 2023-10-24 RX ADMIN — ONDANSETRON 4 MG: 2 INJECTION INTRAMUSCULAR; INTRAVENOUS at 11:26

## 2023-10-24 RX ADMIN — DEXAMETHASONE SODIUM PHOSPHATE 10 MG: 10 INJECTION, SOLUTION INTRAMUSCULAR; INTRAVENOUS at 13:20

## 2023-10-24 RX ADMIN — FENTANYL CITRATE 50 MCG: 50 INJECTION INTRAMUSCULAR; INTRAVENOUS at 13:30

## 2023-10-24 ASSESSMENT — ACTIVITIES OF DAILY LIVING (ADL)
ADLS_ACUITY_SCORE: 35

## 2023-10-24 NOTE — H&P
[facility] Labor and Delivery History and Physical    Caitie Mari MRN# [id number]   Age: 33 year old YOB: 1990     Date of Admission:  10/23/2023    Primary care provider: Arielle Lewis           Chief Complaint:   Caitie Mari is a 33 year old female who is seen in the ER for vaginal bleeding.  Patient endorses prenatal care with her midwife, over a week ago she was confirmed to have a miscarriage consistent with 6 weeks by growth, should have been 10 weeks by dates.  9 days ago she took misoprostol for medical management and did have some tissue and bleeding 6 hours after her dosing.  For last week she then had bleeding like a period, denied significant pain or dizziness.  Now this afternoon she endorsed passing fist size clots over several hours so was instructed to come to the ER.  She states the bleeding has slowed some now, she denies dizziness or chest pain.  She states her cramps are manageable.           Pregnancy history:     OBSTETRIC HISTORY:  - first was vaginal birth at [facility], second was uncomplicated vaginal birth at birth center  OB History    Para Term  AB Living   2 1 1 0 0 1   SAB IAB Ectopic Multiple Live Births   0 0 0 0 1      # Outcome Date GA Lbr Eliecer/2nd Weight Sex Delivery Anes PTL Lv   2             1 Term 19 39w3d 11:10 / 00:47 3.289 kg (7 lb 4 oz) M Vag-Spont Nitrous N GI      Name: GALINDO MARI-CAITIE      Apgar1: 9  Apgar5: 9       EDC: Estimated Date of Delivery: None noted.    Prenatal Labs:   Lab Results   Component Value Date    ABO O 2019    RH Pos 2019    AS Neg 2019    HEPBANG Nonreactive 10/29/2018    CHPCRT Negative 2018    GCPCRT Negative 2018    HGB 13.7 10/23/2023       GBS Status:   Lab Results   Component Value Date    GBS Positive (A) 2019       Active Problem List  Patient Active Problem List   Diagnosis    Irregular menstrual cycle    Research study patient-Has  mouthguard on L and D, please give it to patient when admitted to L and D    Labor and delivery indication for care or intervention    Encounter for triage in pregnant patient    Vaginal delivery    Retained products of conception after miscarriage       Medication Prior to Admission  (Not in a hospital admission)  .        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 The endometrium is heterogeneous and thickened measuring 1.7 cm. Increased vascularity is noted along the distal endometrium. Heterogeneous appearance of the uterus.     RIGHT OVARY: Normal.  LEFT OVARY: Normal.                                                                      IMPRESSION:   1.  No intrauterine gestational sac is identified. The endometrium is heterogeneous and thickened with increased vascularity along the mid to distal endometrium concerning for retained products of conception. Recommend ultrasound follow-up as clinically   indicated.           Assessment:   Tatum Cooper is a  female with retained products of conception after medical management of miscarriage        Plan:   Discussed with patient and her  all options to include expectant management with serial labs, repeat dosing of medical management with misoprostol and D and C.  Discussed risk of bleeding, infection, injury to uterus, need for further procedures, risk of anesthesia, etc.  Patient had two prior deliveries unmedicated and has had sedation only  for finger procedure, no prior anesthetic complications.  Patient ate at 5 pm.  Discussed logistics that if ongoing bleeding or hemodynamically unstable than surgery would be done emergently and require GETA.  Otherwise will make NPO and add her on for procedure in the am.  Also discussed consideration for discharge home and to represent for same day procedure.  She prefers to stay, have serial hemoglobin and be prepared to change status of surgery to emergent if needed.  Discussed with ER staff and anesthesia as well.  All questions answered.     Joan Duran MD    Total time at bedside, in review of medical records and coordination of care is 60 minutes.

## 2023-10-24 NOTE — MEDICATION SCRIBE - ADMISSION MEDICATION HISTORY
Medication Scribe Admission Medication History    Admission medication history is complete. The information provided in this note is only as accurate as the sources available at the time of the update.    Information Source(s): Patient via in-person    Pertinent Information:     Changes made to PTA medication list:  Added: Albuterol, Immune Support, Iron  Deleted: Magnesium, Senokot  Changed: None    Medication Affordability:  Not including over the counter (OTC) medications, was there a time in the past 3 months when you did not take your medications as prescribed because of cost?: No    Allergies reviewed with patient and updates made in EHR: yes    Medication History Completed By: Ghulam Salcido 10/23/2023 10:35 PM    PTA Med List   Medication Sig Last Dose    albuterol (PROAIR HFA/PROVENTIL HFA/VENTOLIN HFA) 108 (90 Base) MCG/ACT inhaler Inhale 1 puff into the lungs every 4 hours as needed More than a month at prn    Fish Oil-Cholecalciferol (OMEGA-3 FISH OIL/VITAMIN D3) 9894-8921 MG-UNIT CAPS Take 1 capsule by mouth daily 10/23/2023 at am    Prenatal Vit-Fe Fumarate-FA (PRENATAL VITAMIN PO) Take 1 tablet by mouth daily 10/23/2023 at am    Probiotic Product (PROBIOTIC PO) Take 1 capsule by mouth daily Seed: 53.6 Billion AFU 10/23/2023 at am    UNABLE TO FIND Take 1 capsule by mouth daily MEDICATION NAME: Madelaine (СЕРГЕЙ) Immune Support+ 10/23/2023 at am    UNABLE TO FIND Take 1 Gum by mouth daily MEDICATION NAME: Dana Sonya's Iron Gummies (18 mg) with Vitamin C 10/23/2023 at am

## 2023-10-24 NOTE — H&P
The History and Physical has been reviewed, the patient has been examined and no changes have occurred in the patient's condition since the H & P was completed.      Divine Frey MD on 10/24/2023 at 12:07 PM

## 2023-10-24 NOTE — OP NOTE
Operative Note     Surgery Date:  10/24/2023    Surgeon:  Monse Chew MD    Pre-operative and post-op diagnosis:  Retained products of conception after missed     Procedure:    Cervical dilation and suction curettage.    EBL:  50 mL    Anesthesia: MAC with local    Specimens:  Products of conception    Complications / Findings:  1.  No apparent complications.  2. No signs of perforation and gentle curettage at end of procedure confirmed no retained products    Indications: Tatum Cooper is a 33 year old yo who was unfortunately diagnosed with a missed .  She as initially treated medically but presented overnight with bleeding and US suggesting retained products.  She was scheduled for D&C this morning due to NPO status.  We discussed management options and she chose to proceed with D&C.  Risks of infection, bleeding, uterine perforation were discussed and informed consent was obtained.  She is Rh Pos.    Procedure:  She was taken to the OR with IV running and MAC anesthesia was administered.  She was prepped and draped and a 'time out' was held to confirm the procedure.  Exam under anesthesia was performed with the above findings.  The bladder was drained with a straight catheter.      A speculum was inserted.  The anterior lip of the cervix was then grasped with a single tooth tenaculum.  Paracervical block was placed with 10 cc of 1% lidocaine.  Cervix was found to be adequately dilated.  First a polyp forceps  was used to remove products of conception.  An 8 mm rigid curette was used after testing for appropriate suction to empty the uterus of products of conception which were grossly seen through the tubing.  A gentle sharp curettage was then performed.  One more pass with the suction curette was then made.  Uterus then felt empty.  All instruments were removed from her vagina and the case was complete.  No apparent complications and she was transferred to the PACU in stable  condition.    Monse Chew MD

## 2023-10-24 NOTE — PROGRESS NOTES
"S: Patient resting in bed.  Reports heavy bleeding last night.  Anxious for D&C.    O:  /59   Pulse 55   Temp 97.9  F (36.6  C) (Oral)   Resp 16   Ht 1.626 m (5' 4\")   Wt 59 kg (130 lb)   SpO2 100%   BMI 22.31 kg/m      A/P:  Reviewed options this morning including expectant management, medical management or D&C for retained products of conception.  Reviewed risks of D&C including pain, bleeding, infection, scarring which may interfere with future pregnancy.  All questions answered.  Scheduled today at about 1130.    MD Arik  "

## 2023-10-24 NOTE — ANESTHESIA CARE TRANSFER NOTE
Patient: Tatum Cooper    Procedure: Procedure(s):  DILATION AND CURETTAGE, UTERUS, USING SUCTION       Diagnosis: Retained products of conception after miscarriage [O03.4]  Diagnosis Additional Information: No value filed.    Anesthesia Type:   MAC     Note:    Oropharynx: oropharynx clear of all foreign objects and spontaneously breathing  Level of Consciousness: awake  Oxygen Supplementation: room air    Independent Airway: airway patency satisfactory and stable  Dentition: dentition unchanged  Vital Signs Stable: post-procedure vital signs reviewed and stable  Report to RN Given: handoff report given  Patient transferred to: Phase II    Handoff Report: Identifed the Patient, Identified the Reponsible Provider, Reviewed the pertinent medical history, Discussed the surgical course, Reviewed Intra-OP anesthesia mangement and issues during anesthesia, Set expectations for post-procedure period and Allowed opportunity for questions and acknowledgement of understanding      Vitals:  Vitals Value Taken Time   /79    Temp 98.0 F    Pulse 70    Resp 12    SpO2 100 %        Electronically Signed By: TIGRE Craft CRNA  October 24, 2023  1:51 PM

## 2023-10-24 NOTE — ANESTHESIA POSTPROCEDURE EVALUATION
Patient: Tatum Cooper    Procedure: Procedure(s):  DILATION AND CURETTAGE, UTERUS, USING SUCTION       Anesthesia Type:  MAC    Note:  Disposition: Outpatient   Postop Pain Control: Uneventful            Sign Out: Well controlled pain   PONV: No   Neuro/Psych: Uneventful            Sign Out: Acceptable/Baseline neuro status   Airway/Respiratory: Uneventful            Sign Out: Acceptable/Baseline resp. status   CV/Hemodynamics: Uneventful            Sign Out: Acceptable CV status; No obvious hypovolemia; No obvious fluid overload   Other NRE: NONE   DID A NON-ROUTINE EVENT OCCUR? No       Last vitals:  Vitals Value Taken Time   /64 10/24/23 1430   Temp     Pulse 59 10/24/23 1437   Resp 16 10/24/23 1350   SpO2 100 % 10/24/23 1437   Vitals shown include unfiled device data.    Electronically Signed By: Keven Peterson MD  October 24, 2023  3:31 PM

## 2023-10-24 NOTE — ED PROVIDER NOTES
EMERGENCY DEPARTMENT PROGRESS NOTE         ED COURSE AND MEDICAL DECISION MAKING  Patient was signed out to me by Dr. Barnes at 6:41 PM      Tatum Cooper is a 33 year old female who presents with vaginal bleeding following intrauterine fetal demise.  At the time of signout, patient is awaiting OB evaluation.    8:42 PM  OB evaluated the patient here in the emergency department.  Plan will be to observe her overnight for likely surgical D&C in the morning.  The patient remained stable and is agreeable to this plan.    LAB  Pertinent labs results reviewed   Results for orders placed or performed during the hospital encounter of 10/23/23   OB  US 1st trimester w transvag    Impression    IMPRESSION:   1.  No intrauterine gestational sac is identified. The endometrium is heterogeneous and thickened with increased vascularity along the mid to distal endometrium concerning for retained products of conception. Recommend ultrasound follow-up as clinically   indicated.   HCG quantitative pregnancy (blood)   Result Value Ref Range    hCG Quantitative 1,386 (H) <5 mIU/mL   Result Value Ref Range    Hemoglobin 13.7 11.7 - 15.7 g/dL         RADIOLOGY    Pertinent imaging reviewed   Please see official radiology report.  OB  US 1st trimester w transvag    Result Date: 10/23/2023  EXAM: US OB <14 WEEKS WITH TRANSVAGINAL SINGLE LOCATION: [facility] DATE: 10/23/2023 INDICATION: s p misoprostal, eval retained poc COMPARISON: None. TECHNIQUE: Transabdominal scans were performed. Endovaginal ultrasound was performed to better visualize the embryo. FINDINGS: UTERUS: No intrauterine gestational sac is identified. The endometrium is heterogeneous and thickened measuring 1.7 cm. Increased vascularity is noted along the distal endometrium. Heterogeneous appearance of the uterus. RIGHT OVARY: Normal. LEFT OVARY: Normal.     IMPRESSION: 1.  No intrauterine gestational sac is identified. The endometrium is  heterogeneous and thickened with increased vascularity along the mid to distal endometrium concerning for retained products of conception. Recommend ultrasound follow-up as clinically indicated.      FINAL IMPRESSION    1. Retained products of conception after miscarriage                Chava Baca MD  10/23/23 2042

## 2023-10-24 NOTE — ANESTHESIA PREPROCEDURE EVALUATION
Anesthesia Pre-Procedure Evaluation    Patient: Tatum Cooper   MRN: [id number] : 1990        Procedure : Procedure(s):  DILATION AND CURETTAGE, UTERUS, USING SUCTION          Past Medical History:   Diagnosis Date    Allergic     food allergies--melons    Fracture of finger     Fracture of foot     Varicella     vaccine      Past Surgical History:   Procedure Laterality Date    CLOSED REDUCTION, PERCUTANEOUS PINNING  HAND, COMBINED  2014    Procedure: COMBINED CLOSED REDUCTION, PERCUTANEOUS PINNING HAND;  Surgeon: Ayala Rabago MD;  Location: [facility]    FINGER SURGERY  2014    right     HC TOOTH EXTRACTION W/FORCEP      wisdom teeth    WISDOM TOOTH EXTRACTION Bilateral       Allergies   Allergen Reactions    Melon Hives    Peggy-Be [Norethindrone] Hives      Social History     Tobacco Use    Smoking status: Never    Smokeless tobacco: Never   Substance Use Topics    Alcohol use: Yes     Comment: Alcoholic Drinks/day: rarely      Wt Readings from Last 1 Encounters:   10/23/23 59 kg (130 lb)        Anesthesia Evaluation   Pt has had prior anesthetic.     No history of anesthetic complications       ROS/MED HX  ENT/Pulmonary:  - neg pulmonary ROS     Neurologic:  - neg neurologic ROS     Cardiovascular:  - neg cardiovascular ROS     METS/Exercise Tolerance:     Hematologic:     (+)      anemia,          Musculoskeletal:       GI/Hepatic:  - neg GI/hepatic ROS     Renal/Genitourinary:  - neg Renal ROS     Endo:  - neg endo ROS     Psychiatric/Substance Use:  - neg psychiatric ROS     Infectious Disease:  - neg infectious disease ROS     Malignancy:       Other:  - neg other ROS          Physical Exam    Airway  airway exam normal      Mallampati: II   TM distance: > 3 FB   Neck ROM: full   Mouth opening: > 3 cm    Respiratory Devices and Support         Dental  no notable dental history     (+) Minor Abnormalities - some fillings, tiny chips      Cardiovascular   cardiovascular exam normal   "     Rhythm and rate: regular and normal     Pulmonary   pulmonary exam normal        breath sounds clear to auscultation           OUTSIDE LABS:  CBC:   Lab Results   Component Value Date    WBC 7.0 09/22/2022    WBC 9.6 05/21/2019    HGB 11.6 (L) 10/24/2023    HGB 12.5 10/23/2023    HCT 43.0 09/22/2022    HCT 41.4 05/21/2019     09/22/2022     05/21/2019     BMP:   Lab Results   Component Value Date     09/22/2022    POTASSIUM 3.7 09/22/2022    CHLORIDE 104 09/22/2022    CO2 28 09/22/2022    BUN 18.0 09/22/2022    CR 0.82 09/22/2022    CR 0.47 (L) 05/21/2019     (H) 09/22/2022     COAGS: No results found for: \"PTT\", \"INR\", \"FIBR\"  POC:   Lab Results   Component Value Date    HCG neg 06/03/2016    HCGS Negative 09/22/2022     HEPATIC:   Lab Results   Component Value Date    ALBUMIN 4.7 09/22/2022    PROTTOTAL 7.5 09/22/2022    ALT 18 09/22/2022    AST 23 09/22/2022    ALKPHOS 91 09/22/2022    BILITOTAL 0.3 09/22/2022     OTHER:   Lab Results   Component Value Date    A1C 5.0 05/10/2018    JOHN 9.4 09/22/2022    LIPASE 32 09/22/2022    TSH 2.82 07/10/2019       Anesthesia Plan    ASA Status:  2    NPO Status:  NPO Appropriate    Anesthesia Type: MAC.     - Reason for MAC: straight local not clinically adequate              Consents    Anesthesia Plan(s) and associated risks, benefits, and realistic alternatives discussed. Questions answered and patient/representative(s) expressed understanding.     - Discussed:     - Discussed with:  Patient            Postoperative Care    Pain management: IV analgesics, Oral pain medications, Multi-modal analgesia.   PONV prophylaxis: Ondansetron (or other 5HT-3), Dexamethasone or Solumedrol, Droperidol or Haldol     Comments:                Jasper Hastings MD  "

## 2023-10-24 NOTE — PROGRESS NOTES
Late entry, discussed plan of care with patient in pre-op holding area.  Explained that surgery had originally been scheduled with me, however I had become delayed by a  delivery on L&D which is why Dr Frey had stepped in for her D&C.  Subsequently main OR ultimately unable to proceed until after I had completed  thus explained that we would proceed with mas we had originally discussed this morning.  Reviewed risks and patient agreeable to proceed    MD Arik

## 2023-10-24 NOTE — OP NOTE
[facility] Brief Operative Note    Pre-operative diagnosis: Retained products of conception after miscarriage [O03.4]   Post-operative diagnosis * No post-op diagnosis entered *   Procedure: Procedure(s):  DILATION AND CURETTAGE, UTERUS, USING SUCTION   Surgeon: Monse Chew MD       Estimated blood loss: Less than 50 ml    Specimens: Retained products of conception   Findings: Dilated cervix, retained products of conception

## 2023-10-25 ENCOUNTER — MYC MEDICAL ADVICE (OUTPATIENT)
Dept: OBGYN | Facility: CLINIC | Age: 33
End: 2023-10-25
Payer: COMMERCIAL

## 2023-10-25 LAB
PATH REPORT.COMMENTS IMP SPEC: NORMAL
PATH REPORT.COMMENTS IMP SPEC: NORMAL
PATH REPORT.FINAL DX SPEC: NORMAL
PATH REPORT.GROSS SPEC: NORMAL
PATH REPORT.MICROSCOPIC SPEC OTHER STN: NORMAL
PATH REPORT.RELEVANT HX SPEC: NORMAL
PHOTO IMAGE: NORMAL

## 2023-10-25 PROCEDURE — 88305 TISSUE EXAM BY PATHOLOGIST: CPT | Mod: 26 | Performed by: PATHOLOGY

## 2023-11-20 ENCOUNTER — TRANSFERRED RECORDS (OUTPATIENT)
Dept: HEALTH INFORMATION MANAGEMENT | Facility: CLINIC | Age: 33
End: 2023-11-20
Payer: COMMERCIAL

## 2023-11-27 ENCOUNTER — PRE VISIT (OUTPATIENT)
Dept: ONCOLOGY | Facility: CLINIC | Age: 33
End: 2023-11-27
Payer: COMMERCIAL

## 2023-11-27 ENCOUNTER — TRANSCRIBE ORDERS (OUTPATIENT)
Dept: OTHER | Age: 33
End: 2023-11-27

## 2023-11-27 DIAGNOSIS — D50.9 ANEMIA, IRON DEFICIENCY: Primary | ICD-10-CM

## 2023-11-27 NOTE — TELEPHONE ENCOUNTER
"RECORDS STATUS - ALL OTHER DIAGNOSIS     Anemia, iron deficiency   RECORDS RECEIVED FROM: NILAM CHOI    Appt Date: TBD NN WQ    obtain visit notes and labs   Action    Action Taken 11/27/2023 11:31AM KE   I called [facility] Ph: [phone] - unavailable. I called again- they pulled up the pt's chart - they have four office visits from 2023. The pt was last seen on Nov 20th 2023. Records will faxed to [phone] Attn: Domonique     11/27/2023 1:04pm KEB   I faxed records from [facility] to HIM and the NN Team.     11/29/2023 9:22AM KEB   I called [facility] to request labs and visit notes. Ph: [phone]- \"it's after hours\". I'll try calling again later. Their vm didn't offer a fax number. The office opens at 10:30AM     11/29/2023 5:05pm KEB   I called the [facility] Ph: [phone] #0 () - unavailable. I'll try again tomorrow in the late morning.     12/5/2023 11:57AM KEB   I called [facility] again #0 () - I left a detailed vm requesting a call back and/or for records to be faxed over soon.     I called [facility] again #2 (Nurse line)- Fax: [phone]. They are going to look into to see if they need a release. The nurse said she will call me back soon.     12/5/2023 2:36pm KE   I called [facility] again #2 - (I missed a call at 1:40pm)- I left a vm requesting a call today before 4pm.       NOTES STATUS DETAILS   OFFICE NOTE from referring provider Arielle Andrea MD    OFFICE NOTE from medical oncologist [facility]  Left a vm for [facility] on 12/5/2023 around noon. Need a fax # or for them to fax over records.     DISCHARGE SUMMARY from hospital     DISCHARGE REPORT from the ER     OPERATIVE REPORT     MEDICATION LIST     CLINICAL TRIAL TREATMENTS TO DATE     LABS     PATHOLOGY REPORTS     ANYTHING RELATED TO DIAGNOSIS Complete    [facility] Labs last updated on 10/24/2023    GENONOMIC TESTING   "   TYPE:     IMAGING (NEED IMAGES & REPORT)     CT SCANS     MRI     MAMMO     ULTRASOUND     PET

## 2023-11-28 ENCOUNTER — PATIENT OUTREACH (OUTPATIENT)
Dept: ONCOLOGY | Facility: CLINIC | Age: 33
End: 2023-11-28
Payer: COMMERCIAL

## 2023-11-28 NOTE — PROGRESS NOTES
Writer placed return call to Irlanda to review her current request. Per Irlanda, she has been symptomatic (RLS, SOB, palpitations) for low iron and has been taking oral iron daily since July with no improvement. Unable to review trend of ferritin as we have limited records. She is aware with normal labs we are unable to schedule but advised that I would see if we can get further records from her midwife group to establish negative trend in ferritin. Irlanda voiced understanding f plan and will await return call once records are received.

## 2023-11-28 NOTE — PROGRESS NOTES
Writer placed call to both [facility] and patient to review records received related to referral for iron deficiency anemia. Current labs reflect Hgb and iron panel/ferritin within normal limits. Ferritin is lower normal at 16. Recently had miscarriage with emergency D&C for heavy bleeding after miscarriage. Message left for both referring provider practice and patient that with normal labs, we are unable to offer an appointment with our Hematology providers. Advised to continue monitoring labs and complete any oral iron regimen as recommended by PCP with future referral if labs fall below normal limits. Direct contact information provided to both,

## 2024-03-19 ENCOUNTER — TRANSCRIBE ORDERS (OUTPATIENT)
Dept: OTHER | Age: 34
End: 2024-03-19

## 2024-03-19 DIAGNOSIS — D50.9 ANEMIA, IRON DEFICIENCY: Primary | ICD-10-CM

## 2024-03-19 NOTE — PROGRESS NOTES
Patient called back to schedule with [facility]. Has been seeing [facility] for IV Iron and her ferritin is now high but Iron low, no anemia, wants second opinion. Referral instructions updated, referral discussed with patient and call transferred to NPS for completion.

## 2024-04-09 NOTE — TELEPHONE ENCOUNTER
RECORDS STATUS - ALL OTHER DIAGNOSIS      RECORDS RECEIVED FROM: [facility], [facility], [facility]   DATE RECEIVED: 4/9   NOTES STATUS DETAILS   OFFICE NOTE from referring provider Epic/[facility] Dr. Tatum Lewis: 11/20/23   OFFICE NOTE from medical oncologist [facility] Dr. Peter Rubio: 2/16/24   OPERATIVE REPORT [facility] 10/24/23: D & C   MEDICATION LIST [facility]   LABS     ANYTHING RELATED TO DIAGNOSIS [facility] 4/1/24 (under 2/26/24 office visit)

## 2024-04-17 ENCOUNTER — VIRTUAL VISIT (OUTPATIENT)
Dept: ONCOLOGY | Facility: HOSPITAL | Age: 34
End: 2024-04-17
Attending: INTERNAL MEDICINE
Payer: COMMERCIAL

## 2024-04-17 ENCOUNTER — PRE VISIT (OUTPATIENT)
Dept: ONCOLOGY | Facility: CLINIC | Age: 34
End: 2024-04-17
Payer: COMMERCIAL

## 2024-04-17 VITALS — BODY MASS INDEX: 21.34 KG/M2 | HEIGHT: 64 IN | WEIGHT: 125 LBS

## 2024-04-17 DIAGNOSIS — D50.0 IRON DEFICIENCY ANEMIA DUE TO CHRONIC BLOOD LOSS: Primary | ICD-10-CM

## 2024-04-17 PROCEDURE — 99204 OFFICE O/P NEW MOD 45 MIN: CPT | Mod: 95 | Performed by: INTERNAL MEDICINE

## 2024-04-17 ASSESSMENT — ENCOUNTER SYMPTOMS
RESPIRATORY NEGATIVE: 1
NEUROLOGICAL NEGATIVE: 1
GASTROINTESTINAL NEGATIVE: 1
FATIGUE: 1
SLEEP DISTURBANCE: 1
ENDOCRINE NEGATIVE: 1
EYES NEGATIVE: 1
MUSCULOSKELETAL NEGATIVE: 1
PALPITATIONS: 1

## 2024-04-17 NOTE — PROGRESS NOTES
Assessment & Plan   Prior iron deficiency anemia, resolved  Infusion reaction with iron infusion given at [facility]    Does not need additional iron (oral or IV) now  No obvious contraindication to repeat pregnancy if she desires  Follow up with us PRN    History  This is an initial hematology consultation visit for this  who has been referred for iron anemia treated with infused iron at [facility].  She developed this around the time she had a complicated miscarriage last year.      She had some steroids with the infusions due to infusion reaction.  She's concerned because ferritin is high but prior serum iron was low (now both are up).    We discussed ferrokinetics.  Given the recent normalization of all of her iron studies and also the normal CBC, she is no longer in need of iron supplements.      ECOG = 0    Patient Active Problem List   Diagnosis    Irregular menstrual cycle    Research study patient-Has mouthguard on L and D, please give it to patient when admitted to L and D    Labor and delivery indication for care or intervention    Encounter for triage in pregnant patient    Vaginal delivery    Retained products of conception after miscarriage     Current Outpatient Medications   Medication Sig Dispense Refill    acetaminophen (TYLENOL) 325 MG tablet Take 2 tablets (650 mg) by mouth every 6 hours as needed for mild pain      albuterol (PROAIR HFA/PROVENTIL HFA/VENTOLIN HFA) 108 (90 Base) MCG/ACT inhaler Inhale 1 puff into the lungs every 4 hours as needed      Fish Oil-Cholecalciferol (OMEGA-3 FISH OIL/VITAMIN D3) 3793-5057 MG-UNIT CAPS Take 1 capsule by mouth daily      ibuprofen (ADVIL/MOTRIN) 600 MG tablet Take 1 tablet (600 mg) by mouth every 6 hours as needed for other (mild and/or inflammatory pain.)      Prenatal Vit-Fe Fumarate-FA (PRENATAL VITAMIN PO) Take 1 tablet by mouth daily      Probiotic Product (PROBIOTIC PO) Take 1 capsule by mouth daily Seed: 53.6 Billion AFU      UNABLE TO FIND  "Take 1 capsule by mouth daily MEDICATION NAME: Madelaine (СЕРГЕЙ) Immune Support+      UNABLE TO FIND Take 1 Gum by mouth daily MEDICATION NAME: Dana Chung Iron Gummies (18 mg) with Vitamin C       No current facility-administered medications for this visit.     Past Medical History:   Diagnosis Date    Allergic     food allergies--melons    Fracture of finger     Fracture of foot     Varicella     vaccine     Past Surgical History:   Procedure Laterality Date    CLOSED REDUCTION, PERCUTANEOUS PINNING  HAND, COMBINED  2014    Procedure: COMBINED CLOSED REDUCTION, PERCUTANEOUS PINNING HAND;  Surgeon: Ayala Rabago MD;  Location:  OR    DILATION AND CURETTAGE SUCTION N/A 10/24/2023    Procedure: DILATION AND CURETTAGE, UTERUS, USING SUCTION;  Surgeon: Monse Chew MD;  Location: [facility] OR    FINGER SURGERY      right     HC TOOTH EXTRACTION W/FORCEP      wisdom teeth    WISDOM TOOTH EXTRACTION Bilateral      Socioeconomic History    Marital status:      Spouse name: Aaron    Number of children: 0    Years of education: 18   Occupational History    Occupation:      Comment: [employer]     Grandfather  of some blood problem, grandmother had breast cancer.  She has two children in     Review of Systems   Constitutional:  Positive for fatigue.   HENT:  Negative.          Has a head cold just now   Eyes: Negative.    Respiratory: Negative.     Cardiovascular:  Positive for palpitations.   Gastrointestinal: Negative.    Endocrine: Negative.    Genitourinary: Negative.  Negative for menstrual problem.    Musculoskeletal: Negative.    Neurological: Negative.    Psychiatric/Behavioral:  Positive for sleep disturbance.    All other systems reviewed and are negative.    Ht 1.626 m (5' 4\")   Wt 56.7 kg (125 lb)   BMI 21.46 kg/m      GENERAL: alert and no distress  EYES: Eyes grossly normal to inspection.  No discharge or erythema, or " obvious scleral/conjunctival abnormalities.  RESP: No audible wheeze, cough, or visible cyanosis.    SKIN: Visible skin clear. No significant rash, abnormal pigmentation or lesions.  NEURO: Cranial nerves grossly intact.  Mentation and speech appropriate for age.  PSYCH: Appropriate affect, tone, and pace of words    Outside labs from [facility]    04/01/2024   WBC K/uL 3.0 8.9 5.5     FINAL Jyoti [facility], [address] [facility] [id number] Phone: () -   04/01/2024   HGB g/dL 11.3 15.2 13.9     FINAL [facility]yoti [facility], [address] [facility] [id number] Phone: () -   04/01/2024   PLT K/uL 113.0 364.0 224     FINAL Jyoti [facility], [address] [facility] [id number] Phone: () -   04/01/2024   Mik % % 43.0 74.0 56.5     FINAL Jyoti [facility], [address] [facility] [id number] Phone: () -   04/01/2024   Mik # (ANC) K/uL 1.6 6.6 3.1     FINAL Jyoti [facility], [address] [facility] [id number] Phone: () -   04/01/2024   IG % % 0.0 0.5 0.4     [facility]AL [facility] [address] [facility] [id number] Phone: () -   04/01/2024   IG # K/uL 0.0 0.03 0.02     FINAL Jyoti [facility], [address] [facility] [id number] Phone: () -   04/01/2024   LY % % 14.0 41.0 34.4     [facility]AL [facility] [id number] Phone: () -   04/01/2024   MO % % 6.0 15.0 6.0     FINAL [facility] [address] [facility] [id number] Phone: () -   04/01/2024   EO % % 0.0 7.0 2.0     FINAL [facility] [address] [facility] [id number] Phone: () -   04/01/2024   BA % % 0.0 2.0 0.7     FINAL [facility] [address] [facility] [id number] Phone: () -   04/01/2024   LY # K/uL 0.4 3.6 1.9     FINAL [facility], [address] [facility] [id number] Phone: () -   04/01/2024   MO # K/uL 0.2 1.3 0.3     FINAL Jyoti Flanagan   [facility], [address] [facility] [id number] Phone: () -   04/01/2024   EO # K/uL 0.0 0.6 0.1     FINAL Jyoti Flanagan   [facility], [address] [facility] [id number] Phone: () -   04/01/2024   BA # K/uL 0.0 0.2 0.0     FINAL Jyoti Flanagan   [facility], [address] [facility] [id number] Phone: () -   04/01/2024   NRBC % #/100WBC 0.0 0.2 0.0     FINAL Jyoti Flanagan   [facility], [address] [facility] [id number] Phone: () -   04/01/2024   RBC M/uL 3.9 5.1 4.61     FINAL Jyoti Flanagan   [facility], [address] [facility] [id number] Phone: () -   04/01/2024   HCT % 35.0 48.0 41.2     FINAL Jyoti Flanagan   [facility], [address] [facility] [id number] Phone: () -   04/01/2024   MCV fL 80.0 104.0 89.4     FINAL Jyoti Flanagan   [facility], [address] [facility] [id number] Phone: () -   04/01/2024   MCH pg 26.0 35.0 30.2     FINAL Jyoti Flanagan   [facility], [address] [facility] [id number] Phone: () -   04/01/2024   MCHC g/dL 30.0 35.0 33.7     FINAL Jyoti Flanagan   [facility], [address] [facility] [id number] Phone: () -   04/01/2024   MPV fL 9.5 13.4 8.6 Low   FINAL Jytoi Flanagan   [facility], [address] [facility] [id number] Phone: () -   04/01/2024   RDW % 11.4 16.1 12.70     [facility]AL [facility] [address] [facility] [id number] Phone: () -   04/01/2024   Reticulocyte, absolute M/uL 0.02 0.08 0.08     [facility]AL [facility] [address] [facility] [id number] Phone: () -   04/01/2024   Reticulocyte count % 0.4 1.6 1.77 High   FINAL [facility] [address] [facility] [id number] Phone: () -   04/01/2024   Immature reticulocyte fraction, % %  0.0 16.5 3.70     FINAL Jyoti Flanagan   [facility], [address] [facility] [id number] Phone: () -   04/01/2024   Reticulocyte cellular hemoglobin pg 28.0 37.0 35.3     FINAL Jyoti Flanagan   [facility], [address] [facility] [id number] Phone: () -   04/01/2024 Ferritin panel Ferritin ng/mL 6.24 137.0 276.00 High   FINAL Jyoti [facility], [facility] [address] [id number] Phone: [phone]   04/01/2024 CMP Albumin g/dL 3.5 5.0 4.7     FINAL Jyoti [facility], [address] [place] [address] [address] [id number] Phone: [phone]   04/01/2024 CMP Alkaline phosphatase U/L 36.0 125.0 46     FINAL Jyoti [facility], [address] [place] [address] [address] [id number] Phone: [phone]   04/01/2024 CMP ALT/SGPT U/L 0.0 34.0 18     FINAL Jyoti [facility], [address] [place] [address] [address] [id number] Phone: [phone]   04/01/2024 CMP AST/SGOT U/L 14.0 36.0 32     FINAL Jyoti [facility], [address] [place] [address] [address] [id number] Phone: [phone]   04/01/2024 CMP BUN mg/dL 7.0 17.0 17.0     FINAL Jyoti [facility], [address] [place] [address] [address] [id number] Phone: [phone]   04/01/2024 CMP Calcium mg/dL 8.4 10.2 9.5     FINAL Jyoti [facility], [address] [place] [address] [address] [id number] Phone: [phone]   04/01/2024 CMP Chloride mmol/L 96.0 107.0 107     FINAL Jyoti Milan [facility], [address] [place] [address] [address] [id number] Phone: [phone]   04/01/2024 CMP CO2 mmol/L 22.0 30.0 26   The expected total allowable error for CO2 is 5.6%. We have seen up to 10% difference in values if reported at the end of the 96 hour stability window. Please consider the clinical significance of a 2.0-2.5 mmol/L lower reported  CO2 value if reported at the end of the 96 hour stability window. FINAL Jyoti Flanagan * [facility], [facility]0 [facility] [address] [id number] Phone: [phone]   04/01/2024 CMP Creatinine mg/dL 0.66 1.25 0.70     FINAL Jyoti Flanagan * [facility], [address] [facility] [address] [id number] Phone: [phone]   04/01/2024 CMP GFR estimate ml/min/1.73m^2     117.0   GFR is calculated using the CKD-EPI equation. FINAL Jyoti Flanagan * [facility], [address] [place] [address] [address] [id number] Phone: [phone]   04/01/2024 CMP Glucose mg/dL 74.0 100.0 84     FINAL Jyoti Flanagan * [facility], [address] [place] [address] [address] [place] [address] [id number] Phone: [phone]   04/01/2024 CMP Potassium mmol/L 3.5 5.1 3.9     FINAL Jyoti [facility], [facility]0 [facility] [address] [place] [address] [id number] Phone: [phone]   04/01/2024 CMP Sodium mmol/L 137.0 145.0 141     FINAL Jyoti Falnagan * [facility], [address] [place] [address] [address] [place] [address] [id number] Phone: [phone]   04/01/2024 CMP Bilirubin, total mg/dL 0.2 1.3 1.0     FINAL Jyoti Flanagan * [facility], [address] [place] [address] [address] [id number] Phone: [phone]   04/01/2024 CMP Total protein g/dL 6.3 8.2 7.3     FINAL Jyoti Flanagan * [facility], [address] [place] [address] [address] [place] [address] [id number] Phone: [phone]   04/01/2024 Iron profile dTIBC-v ug/dL 265.0 497.0 255 Low   FINAL Jyoti Milan [facility], [address] [place] [address] [address] [id number] Phone: [phone]   04/01/2024 Iron profile Iron ug/dL 37.0 170.0 181 High   FINAL Jyoti Flanagan * [facility], [facility]0 [facility] [address] [facility] [id number] Phone: [phone]   04/01/2024 Iron profile Unbound iron capacity ug/dL 75.0 410.0 74 Low   FINAL Jyoti Flanagan * [facility]  [place], [address] [facility] [address] [id number] Phone: [phone]   04/01/2024 Iron profile Iron, % saturation % 20.0 55.0 71 High   FINAL Jyoti Flanagan * [facility], [facility]0 [facility] [address] [id number] Phone: [phone]       No results found for this or any previous visit (from the past 744 hour(s)).      Platform:  [employer]  FTF start: 857  FTF end: 911  nonFTF time (chart review, coordination of care, documentation) = 15 min  Patient location = home  Provider location = on site

## 2024-04-17 NOTE — NURSING NOTE
Is the patient currently in the state of [place]? YES    Visit mode:VIDEO    If the visit is dropped, the patient can be reconnected by: TELEPHONE VISIT: Phone number: [phone]    Will anyone else be joining the visit? NO  (If patient encounters technical issues they should call [phone]    How would you like to obtain your AVS? MyChart    Are changes needed to the allergy or medication list? No    Are refills needed on medications prescribed by this physician? NO    Reason for visit: Consult    Eleonora ALEJANDRA

## 2024-08-30 ENCOUNTER — TRANSFERRED RECORDS (OUTPATIENT)
Dept: HEALTH INFORMATION MANAGEMENT | Facility: CLINIC | Age: 34
End: 2024-08-30
Payer: COMMERCIAL

## 2024-09-15 ENCOUNTER — HEALTH MAINTENANCE LETTER (OUTPATIENT)
Age: 34
End: 2024-09-15

## 2025-01-13 NOTE — TELEPHONE ENCOUNTER
Elle paged at 1830 to report sx of frequency and dysuria since yesterday evening, which is worsening. She is staying well hydrated and emptying her bladder regularly. Has not has a UTI in the past. No recent antibiotic treatment or hx of reaction to antibiotics. Discussed continuing hydration and will send rx to pt's preferred pharmacy. Advised to follow up in clinic if sx worsen or do not resolve in the next 24-48 hours. Pt agrees.     TIGRE Cisse CNM     bilateral

## (undated) DEVICE — TUBING VACUUM COLLECTION 6FT 23116

## (undated) DEVICE — SUCTION CANNULA UTERINE 08MM CVD 022108-10

## (undated) DEVICE — SOL WATER IRRIG 1000ML BOTTLE 2F7114

## (undated) DEVICE — PREP DYNA-HEX 4% CHG SCRUB 4OZ BOTTLE MDS098710

## (undated) DEVICE — MAT FLOOR SURGICAL 40X38 0702140238

## (undated) DEVICE — DRSG TELFA 3X4" 1050

## (undated) DEVICE — NEEDLE HYPO 21 X 2 200318

## (undated) DEVICE — GLOVE BIOGEL PI ULTRATOUCH G SZ 6.5 42165

## (undated) DEVICE — GLOVE UNDER INDICATOR PI SZ 7.0 LF 41670

## (undated) DEVICE — CUSTOM PACK PERI GYN SMA5BPGHEA

## (undated) DEVICE — BRIEF STRETCH XL MPS40

## (undated) RX ORDER — LIDOCAINE HYDROCHLORIDE 10 MG/ML
INJECTION, SOLUTION EPIDURAL; INFILTRATION; INTRACAUDAL; PERINEURAL
Status: DISPENSED
Start: 2023-10-24

## (undated) RX ORDER — KETOROLAC TROMETHAMINE 30 MG/ML
INJECTION, SOLUTION INTRAMUSCULAR; INTRAVENOUS
Status: DISPENSED
Start: 2023-10-24

## (undated) RX ORDER — ONDANSETRON 2 MG/ML
INJECTION INTRAMUSCULAR; INTRAVENOUS
Status: DISPENSED
Start: 2019-05-21

## (undated) RX ORDER — PROPOFOL 10 MG/ML
INJECTION, EMULSION INTRAVENOUS
Status: DISPENSED
Start: 2023-10-24

## (undated) RX ORDER — FENTANYL CITRATE 50 UG/ML
INJECTION, SOLUTION INTRAMUSCULAR; INTRAVENOUS
Status: DISPENSED
Start: 2023-10-24

## (undated) RX ORDER — DEXAMETHASONE SODIUM PHOSPHATE 10 MG/ML
INJECTION, SOLUTION INTRAMUSCULAR; INTRAVENOUS
Status: DISPENSED
Start: 2023-10-24